# Patient Record
Sex: FEMALE | Race: WHITE | URBAN - METROPOLITAN AREA
[De-identification: names, ages, dates, MRNs, and addresses within clinical notes are randomized per-mention and may not be internally consistent; named-entity substitution may affect disease eponyms.]

---

## 2022-09-10 ENCOUNTER — APPOINTMENT (OUTPATIENT)
Dept: GENERAL RADIOLOGY | Age: 37
DRG: 918 | End: 2022-09-10
Payer: COMMERCIAL

## 2022-09-10 ENCOUNTER — HOSPITAL ENCOUNTER (INPATIENT)
Age: 37
LOS: 2 days | Discharge: HOME | DRG: 918 | End: 2022-09-12
Attending: EMERGENCY MEDICINE | Admitting: STUDENT IN AN ORGANIZED HEALTH CARE EDUCATION/TRAINING PROGRAM
Payer: COMMERCIAL

## 2022-09-10 ENCOUNTER — APPOINTMENT (OUTPATIENT)
Dept: CT IMAGING | Age: 37
DRG: 918 | End: 2022-09-10
Payer: COMMERCIAL

## 2022-09-10 DIAGNOSIS — F11.10 OPIATE ABUSE, CONTINUOUS (HCC): ICD-10-CM

## 2022-09-10 DIAGNOSIS — R41.82 ALTERED MENTAL STATUS, UNSPECIFIED ALTERED MENTAL STATUS TYPE: ICD-10-CM

## 2022-09-10 DIAGNOSIS — T50.901A DRUG OVERDOSE, ACCIDENTAL OR UNINTENTIONAL, INITIAL ENCOUNTER: ICD-10-CM

## 2022-09-10 DIAGNOSIS — R77.8 ELEVATED TROPONIN: Primary | ICD-10-CM

## 2022-09-10 PROBLEM — F15.929 AMPHETAMINE INTOXICATION WITH COMPLICATION (HCC): Status: ACTIVE | Noted: 2022-09-10

## 2022-09-10 PROBLEM — E66.09 CLASS 1 OBESITY DUE TO EXCESS CALORIES WITH SERIOUS COMORBIDITY AND BODY MASS INDEX (BMI) OF 34.0 TO 34.9 IN ADULT: Status: ACTIVE | Noted: 2022-09-10

## 2022-09-10 PROBLEM — F19.929: Status: ACTIVE | Noted: 2022-09-10

## 2022-09-10 PROBLEM — D72.829 LEUKOCYTOSIS: Status: ACTIVE | Noted: 2022-09-10

## 2022-09-10 PROBLEM — F10.929 ALCOHOL INTOXICATION (HCC): Status: ACTIVE | Noted: 2022-09-10

## 2022-09-10 PROBLEM — I21.A1 TYPE 2 ACUTE MYOCARDIAL INFARCTION (HCC): Status: ACTIVE | Noted: 2022-09-10

## 2022-09-10 PROBLEM — E16.2 HYPOGLYCEMIA: Status: ACTIVE | Noted: 2022-09-10

## 2022-09-10 PROBLEM — F12.10 MARIJUANA ABUSE: Status: ACTIVE | Noted: 2022-09-10

## 2022-09-10 PROBLEM — E83.39 HYPERPHOSPHATEMIA: Status: ACTIVE | Noted: 2022-09-10

## 2022-09-10 LAB
ABSOLUTE EOS #: 0 K/UL (ref 0–0.4)
ABSOLUTE IMMATURE GRANULOCYTE: 0.23 K/UL (ref 0–0.3)
ABSOLUTE LYMPH #: 0.92 K/UL (ref 1–4.8)
ABSOLUTE MONO #: 0.69 K/UL (ref 0.2–0.8)
AMMONIA: 41 UMOL/L (ref 11–51)
AMPHETAMINE SCREEN URINE: POSITIVE
ANION GAP SERPL CALCULATED.3IONS-SCNC: 19 MMOL/L (ref 9–17)
ANTI-XA UNFRAC HEPARIN: 0.11 IU/L (ref 0.3–0.7)
BARBITURATE SCREEN URINE: NEGATIVE
BASOPHILS # BLD: 0 %
BASOPHILS ABSOLUTE: 0 K/UL (ref 0–0.2)
BENZODIAZEPINE SCREEN, URINE: NEGATIVE
BUN BLDV-MCNC: 11 MG/DL (ref 6–20)
BUN/CREAT BLD: 8 (ref 9–20)
CALCIUM SERPL-MCNC: 9 MG/DL (ref 8.6–10.4)
CANNABINOID SCREEN URINE: POSITIVE
CHLORIDE BLD-SCNC: 101 MMOL/L (ref 98–107)
CO2: 19 MMOL/L (ref 20–31)
COCAINE METABOLITE, URINE: NEGATIVE
CREAT SERPL-MCNC: 1.41 MG/DL (ref 0.5–0.9)
EKG ATRIAL RATE: 113 BPM
EKG P AXIS: 54 DEGREES
EKG P-R INTERVAL: 150 MS
EKG Q-T INTERVAL: 348 MS
EKG QRS DURATION: 96 MS
EKG QTC CALCULATION (BAZETT): 477 MS
EKG R AXIS: 70 DEGREES
EKG T AXIS: 6 DEGREES
EKG VENTRICULAR RATE: 113 BPM
EOSINOPHILS RELATIVE PERCENT: 0 % (ref 1–4)
ETHANOL PERCENT: 0.1 %
ETHANOL: 99 MG/DL
FENTANYL URINE: POSITIVE
GFR AFRICAN AMERICAN: 51 ML/MIN
GFR NON-AFRICAN AMERICAN: 42 ML/MIN
GFR SERPL CREATININE-BSD FRML MDRD: ABNORMAL ML/MIN/{1.73_M2}
GLUCOSE BLD-MCNC: 164 MG/DL (ref 65–105)
GLUCOSE BLD-MCNC: 164 MG/DL (ref 70–99)
HCG QUALITATIVE: NEGATIVE
HCT VFR BLD CALC: 45.1 % (ref 36.3–47.1)
HEMOGLOBIN: 14.3 G/DL (ref 11.9–15.1)
IMMATURE GRANULOCYTES: 1 %
INR BLD: 1.2
INR BLD: 1.3
LACTIC ACID: 2 MMOL/L (ref 0.5–2.2)
LYMPHOCYTES # BLD: 4 % (ref 24–44)
MAGNESIUM: 2.7 MG/DL (ref 1.6–2.6)
MCH RBC QN AUTO: 30.9 PG (ref 25.2–33.5)
MCHC RBC AUTO-ENTMCNC: 31.7 G/DL (ref 28.4–34.8)
MCV RBC AUTO: 97.4 FL (ref 82.6–102.9)
METHADONE SCREEN, URINE: NEGATIVE
MONOCYTES # BLD: 3 % (ref 1–7)
NRBC AUTOMATED: 0 PER 100 WBC
OPIATES, URINE: NEGATIVE
OXYCODONE SCREEN URINE: NEGATIVE
PARTIAL THROMBOPLASTIN TIME: 31.9 SEC (ref 23.9–33.8)
PARTIAL THROMBOPLASTIN TIME: 34.4 SEC (ref 23.9–33.8)
PDW BLD-RTO: 12.8 % (ref 11.8–14.4)
PHENCYCLIDINE, URINE: NEGATIVE
PHOSPHORUS: 4.2 MG/DL (ref 2.6–4.5)
PHOSPHORUS: 9.5 MG/DL (ref 2.6–4.5)
PLATELET # BLD: 279 K/UL (ref 138–453)
PMV BLD AUTO: 9.5 FL (ref 8.1–13.5)
POTASSIUM SERPL-SCNC: 4.9 MMOL/L (ref 3.7–5.3)
PROTHROMBIN TIME: 15.3 SEC (ref 11.5–14.2)
PROTHROMBIN TIME: 16.5 SEC (ref 11.5–14.2)
RBC # BLD: 4.63 M/UL (ref 3.95–5.11)
SEG NEUTROPHILS: 92 % (ref 36–66)
SEGMENTED NEUTROPHILS ABSOLUTE COUNT: 21.16 K/UL (ref 1.8–7.7)
SODIUM BLD-SCNC: 139 MMOL/L (ref 135–144)
TEST INFORMATION: ABNORMAL
TROPONIN, HIGH SENSITIVITY: 176 NG/L (ref 0–14)
TROPONIN, HIGH SENSITIVITY: 181 NG/L (ref 0–14)
TROPONIN, HIGH SENSITIVITY: 40 NG/L (ref 0–14)
TROPONIN, HIGH SENSITIVITY: 94 NG/L (ref 0–14)
WBC # BLD: 23 K/UL (ref 3.5–11.3)

## 2022-09-10 PROCEDURE — 70450 CT HEAD/BRAIN W/O DYE: CPT

## 2022-09-10 PROCEDURE — 2580000003 HC RX 258: Performed by: EMERGENCY MEDICINE

## 2022-09-10 PROCEDURE — 96366 THER/PROPH/DIAG IV INF ADDON: CPT

## 2022-09-10 PROCEDURE — 80048 BASIC METABOLIC PNL TOTAL CA: CPT

## 2022-09-10 PROCEDURE — 85520 HEPARIN ASSAY: CPT

## 2022-09-10 PROCEDURE — 71045 X-RAY EXAM CHEST 1 VIEW: CPT

## 2022-09-10 PROCEDURE — 96361 HYDRATE IV INFUSION ADD-ON: CPT

## 2022-09-10 PROCEDURE — 83735 ASSAY OF MAGNESIUM: CPT

## 2022-09-10 PROCEDURE — 84703 CHORIONIC GONADOTROPIN ASSAY: CPT

## 2022-09-10 PROCEDURE — 96375 TX/PRO/DX INJ NEW DRUG ADDON: CPT

## 2022-09-10 PROCEDURE — 82947 ASSAY GLUCOSE BLOOD QUANT: CPT

## 2022-09-10 PROCEDURE — 85610 PROTHROMBIN TIME: CPT

## 2022-09-10 PROCEDURE — 84484 ASSAY OF TROPONIN QUANT: CPT

## 2022-09-10 PROCEDURE — 6370000000 HC RX 637 (ALT 250 FOR IP): Performed by: INTERNAL MEDICINE

## 2022-09-10 PROCEDURE — 83605 ASSAY OF LACTIC ACID: CPT

## 2022-09-10 PROCEDURE — 93005 ELECTROCARDIOGRAM TRACING: CPT | Performed by: EMERGENCY MEDICINE

## 2022-09-10 PROCEDURE — 87040 BLOOD CULTURE FOR BACTERIA: CPT

## 2022-09-10 PROCEDURE — 84100 ASSAY OF PHOSPHORUS: CPT

## 2022-09-10 PROCEDURE — 36415 COLL VENOUS BLD VENIPUNCTURE: CPT

## 2022-09-10 PROCEDURE — G0480 DRUG TEST DEF 1-7 CLASSES: HCPCS

## 2022-09-10 PROCEDURE — 85730 THROMBOPLASTIN TIME PARTIAL: CPT

## 2022-09-10 PROCEDURE — 6360000002 HC RX W HCPCS

## 2022-09-10 PROCEDURE — 99285 EMERGENCY DEPT VISIT HI MDM: CPT

## 2022-09-10 PROCEDURE — 2060000000 HC ICU INTERMEDIATE R&B

## 2022-09-10 PROCEDURE — 93005 ELECTROCARDIOGRAM TRACING: CPT | Performed by: INTERNAL MEDICINE

## 2022-09-10 PROCEDURE — 82140 ASSAY OF AMMONIA: CPT

## 2022-09-10 PROCEDURE — 96365 THER/PROPH/DIAG IV INF INIT: CPT

## 2022-09-10 PROCEDURE — 2580000003 HC RX 258: Performed by: INTERNAL MEDICINE

## 2022-09-10 PROCEDURE — 6360000002 HC RX W HCPCS: Performed by: EMERGENCY MEDICINE

## 2022-09-10 PROCEDURE — 80307 DRUG TEST PRSMV CHEM ANLYZR: CPT

## 2022-09-10 PROCEDURE — 99223 1ST HOSP IP/OBS HIGH 75: CPT | Performed by: INTERNAL MEDICINE

## 2022-09-10 PROCEDURE — 85025 COMPLETE CBC W/AUTO DIFF WBC: CPT

## 2022-09-10 RX ORDER — ONDANSETRON 2 MG/ML
4 INJECTION INTRAMUSCULAR; INTRAVENOUS EVERY 6 HOURS PRN
Status: DISCONTINUED | OUTPATIENT
Start: 2022-09-10 | End: 2022-09-12 | Stop reason: HOSPADM

## 2022-09-10 RX ORDER — SODIUM CHLORIDE 0.9 % (FLUSH) 0.9 %
5-40 SYRINGE (ML) INJECTION EVERY 12 HOURS SCHEDULED
Status: DISCONTINUED | OUTPATIENT
Start: 2022-09-10 | End: 2022-09-12 | Stop reason: HOSPADM

## 2022-09-10 RX ORDER — LORAZEPAM 1 MG/1
2 TABLET ORAL
Status: DISCONTINUED | OUTPATIENT
Start: 2022-09-10 | End: 2022-09-12 | Stop reason: HOSPADM

## 2022-09-10 RX ORDER — ONDANSETRON 2 MG/ML
INJECTION INTRAMUSCULAR; INTRAVENOUS
Status: COMPLETED
Start: 2022-09-10 | End: 2022-09-10

## 2022-09-10 RX ORDER — LORAZEPAM 2 MG/ML
2 INJECTION INTRAMUSCULAR
Status: DISCONTINUED | OUTPATIENT
Start: 2022-09-10 | End: 2022-09-12 | Stop reason: HOSPADM

## 2022-09-10 RX ORDER — LORAZEPAM 2 MG/ML
1 INJECTION INTRAMUSCULAR
Status: DISCONTINUED | OUTPATIENT
Start: 2022-09-10 | End: 2022-09-12 | Stop reason: HOSPADM

## 2022-09-10 RX ORDER — SODIUM CHLORIDE 0.9 % (FLUSH) 0.9 %
5-40 SYRINGE (ML) INJECTION PRN
Status: DISCONTINUED | OUTPATIENT
Start: 2022-09-10 | End: 2022-09-12 | Stop reason: HOSPADM

## 2022-09-10 RX ORDER — SODIUM CHLORIDE 9 MG/ML
INJECTION, SOLUTION INTRAVENOUS PRN
Status: DISCONTINUED | OUTPATIENT
Start: 2022-09-10 | End: 2022-09-12 | Stop reason: HOSPADM

## 2022-09-10 RX ORDER — SODIUM CHLORIDE 9 MG/ML
INJECTION, SOLUTION INTRAVENOUS CONTINUOUS
Status: ACTIVE | OUTPATIENT
Start: 2022-09-10 | End: 2022-09-11

## 2022-09-10 RX ORDER — HEPARIN SODIUM 10000 [USP'U]/100ML
5-30 INJECTION, SOLUTION INTRAVENOUS CONTINUOUS
Status: DISCONTINUED | OUTPATIENT
Start: 2022-09-10 | End: 2022-09-11

## 2022-09-10 RX ORDER — LORAZEPAM 2 MG/ML
4 INJECTION INTRAMUSCULAR
Status: DISCONTINUED | OUTPATIENT
Start: 2022-09-10 | End: 2022-09-12 | Stop reason: HOSPADM

## 2022-09-10 RX ORDER — HEPARIN SODIUM 1000 [USP'U]/ML
4000 INJECTION, SOLUTION INTRAVENOUS; SUBCUTANEOUS ONCE
Status: COMPLETED | OUTPATIENT
Start: 2022-09-10 | End: 2022-09-10

## 2022-09-10 RX ORDER — LORAZEPAM 1 MG/1
1 TABLET ORAL
Status: DISCONTINUED | OUTPATIENT
Start: 2022-09-10 | End: 2022-09-12 | Stop reason: HOSPADM

## 2022-09-10 RX ORDER — LORAZEPAM 1 MG/1
4 TABLET ORAL
Status: DISCONTINUED | OUTPATIENT
Start: 2022-09-10 | End: 2022-09-12 | Stop reason: HOSPADM

## 2022-09-10 RX ORDER — ONDANSETRON 2 MG/ML
4 INJECTION INTRAMUSCULAR; INTRAVENOUS ONCE
Status: COMPLETED | OUTPATIENT
Start: 2022-09-10 | End: 2022-09-10

## 2022-09-10 RX ORDER — ACETAMINOPHEN 325 MG/1
650 TABLET ORAL EVERY 4 HOURS PRN
Status: DISCONTINUED | OUTPATIENT
Start: 2022-09-10 | End: 2022-09-12 | Stop reason: HOSPADM

## 2022-09-10 RX ORDER — GAUZE BANDAGE 2" X 2"
100 BANDAGE TOPICAL DAILY
Status: DISCONTINUED | OUTPATIENT
Start: 2022-09-10 | End: 2022-09-12 | Stop reason: HOSPADM

## 2022-09-10 RX ORDER — LORAZEPAM 1 MG/1
3 TABLET ORAL
Status: DISCONTINUED | OUTPATIENT
Start: 2022-09-10 | End: 2022-09-12 | Stop reason: HOSPADM

## 2022-09-10 RX ORDER — 0.9 % SODIUM CHLORIDE 0.9 %
1000 INTRAVENOUS SOLUTION INTRAVENOUS ONCE
Status: COMPLETED | OUTPATIENT
Start: 2022-09-10 | End: 2022-09-10

## 2022-09-10 RX ORDER — ONDANSETRON 4 MG/1
4 TABLET, ORALLY DISINTEGRATING ORAL EVERY 8 HOURS PRN
Status: DISCONTINUED | OUTPATIENT
Start: 2022-09-10 | End: 2022-09-12 | Stop reason: HOSPADM

## 2022-09-10 RX ORDER — HEPARIN SODIUM 1000 [USP'U]/ML
4000 INJECTION, SOLUTION INTRAVENOUS; SUBCUTANEOUS PRN
Status: DISCONTINUED | OUTPATIENT
Start: 2022-09-10 | End: 2022-09-11

## 2022-09-10 RX ORDER — HEPARIN SODIUM 1000 [USP'U]/ML
2000 INJECTION, SOLUTION INTRAVENOUS; SUBCUTANEOUS PRN
Status: DISCONTINUED | OUTPATIENT
Start: 2022-09-10 | End: 2022-09-11

## 2022-09-10 RX ORDER — LORAZEPAM 2 MG/ML
3 INJECTION INTRAMUSCULAR
Status: DISCONTINUED | OUTPATIENT
Start: 2022-09-10 | End: 2022-09-12 | Stop reason: HOSPADM

## 2022-09-10 RX ADMIN — HEPARIN SODIUM 2000 UNITS: 1000 INJECTION INTRAVENOUS; SUBCUTANEOUS at 22:19

## 2022-09-10 RX ADMIN — Medication 100 MG: at 19:58

## 2022-09-10 RX ADMIN — SODIUM CHLORIDE: 9 INJECTION, SOLUTION INTRAVENOUS at 20:01

## 2022-09-10 RX ADMIN — SODIUM CHLORIDE 1000 ML: 9 INJECTION, SOLUTION INTRAVENOUS at 13:35

## 2022-09-10 RX ADMIN — HEPARIN SODIUM 4000 UNITS: 1000 INJECTION INTRAVENOUS; SUBCUTANEOUS at 15:56

## 2022-09-10 RX ADMIN — SODIUM CHLORIDE 1000 ML: 9 INJECTION, SOLUTION INTRAVENOUS at 11:39

## 2022-09-10 RX ADMIN — HEPARIN SODIUM AND DEXTROSE 12 UNITS/KG/HR: 10000; 5 INJECTION INTRAVENOUS at 15:58

## 2022-09-10 RX ADMIN — ONDANSETRON 4 MG: 2 INJECTION INTRAMUSCULAR; INTRAVENOUS at 14:30

## 2022-09-10 RX ADMIN — ONDANSETRON HYDROCHLORIDE 4 MG: 2 INJECTION, SOLUTION INTRAVENOUS at 14:30

## 2022-09-10 ASSESSMENT — PAIN DESCRIPTION - LOCATION: LOCATION: HEAD

## 2022-09-10 ASSESSMENT — PAIN SCALES - GENERAL: PAINLEVEL_OUTOF10: 6

## 2022-09-10 ASSESSMENT — PAIN DESCRIPTION - DESCRIPTORS: DESCRIPTORS: ACHING

## 2022-09-10 NOTE — ED NOTES
The following labs were labeled with appropriate pt sticker and tubed to lab:     [] Blue     [] Lavender   [] on ice  [x] Green/yellow  [] Green/black [] on ice  [] Noretta Beagle  [] on ice  [] Yellow  [] Red  [] Pink  [] ABG  [] VBG    [] COVID-19 swab    [] Rapid  [] PCR  [] Flu swab  [] Peds Viral Panel     [] Urine Sample  [] Pelvic Cultures  [] Blood Cultures  [] X 2  [] STREP Cultures       Araseli Sosa RN  09/10/22 5900

## 2022-09-10 NOTE — ED NOTES
Bedside commode placed at pt bedside. No other needs at this time.       Nj Jacome RN  09/10/22 2016

## 2022-09-10 NOTE — ED NOTES
Pt upset with NPO status, no evidence of education understood at this time as evidenced by pt stating, \"If I don't get something to drink in the next 20 minutes, I'm going to walk my happy ass Niyah Micaela here. \"   Dr Marisa Castelan at bedside to update pt on plan of care and NPO status. Pt in NAD, personal items and call light within reach. Will con't to monitor.       Maria R Godfrey RN  09/10/22 5794

## 2022-09-10 NOTE — ED NOTES
Pt threatening to leave AMA again, pt removed all telemetry \"to get us to get pt something to drink,\". [address], [facility] sent perfect serve message to cardiology about NPO status. Will con't to monitor pt, NAD noted.       Cherise Avila RN  09/10/22 7868

## 2022-09-10 NOTE — H&P
[facility]  Office: [address], DO, Manuela Li, DO, Alejandrinajustino Gutierrez, DO, Ca Dawkinsalonso Rosas, DO, Tesha You MD, Chriss Awad MD, Yessi Miranda MD, Kayla Grissom MD,  John Eaton MD, Collins Gallegos MD, Andie Marc, DO, Zackary Shah MD,  Toma Marmolejo MD, Karen Tijerina MD, Sander Song, DO, Edson Harry MD, Neil Granados MD, Celia Sebastian MD, Joel Brown MD, Dyan Ornelas MD, Nancy Alaniz MD, Ivis Recinos, DO, Patience Baltazar MD, Dietrich Halsted, MD, Luis Antonio Tony, CNP,  Gisselle Perkins, CNP, Monroe Walton, CNP, Radha Bran, CNP,  Corona Hernandez, [facility], Aayush Liriano, CNP, Paula Palacios, CNP, Patricia Andrade, CNP, Stuart Lechuga, CNP, Leonarda Collins, CNP, Kunal Slater, PA-C, Lalita Monte, CNS, Monica Barr, [facility], Eliezer Gonzalez, CNP, Ira Al, CNP, Abelino Moran, [facility]    HISTORY AND PHYSICAL EXAMINATION            Date:   9/10/2022  Patient name:  Wei Deluna  Date of admission:  9/10/2022 11:41 AM  MRN:   [id number]  Account:  [de-identified]  YOB: 1986  PCP:    No primary care provider on file. Room:   [place] [address]  Code Status:    No Order    Chief Complaint:     Chief Complaint   Patient presents with    Altered Mental Status     LS3         History Obtained From:     patient    History of Present Illness:     Wei Deluna is a 39 y.o.  who presents to our hospital for evaluation of altered mental status. On admission, patient was obtunded and non-responsive. She was found to be hypoglycemic and urine drug screen was positive for amphetamines, marijuana, fentanyl. Patient also had a positive ethanol level. Patient is she was out at a bar with friends and does not specifically recall taking any illicit substances. She currently feels very fatigued but denies any chest pain, nausea, vomiting, diarrhea. Has no chills, cough.   On admission, troponin was elevated up from . Patient also noted to have elevated creatinine of 1.4 and high anion gap metabolic acidosis. She denies any sick contacts. Denies any previous medical history. Denies any cocaine use. She does not recall any events leading up to her hospitalization. Past Medical History:     Past Medical History:   Diagnosis Date    Eclampsia (toxemia of pregnancy)         Past Surgical History:     Past Surgical History:   Procedure Laterality Date    HERNIA REPAIR  2010        Medications Prior to Admission:     Prior to Admission medications    Not on File        Allergies:     Patient has no known allergies. Social History:     Tobacco:    reports that she has been smoking cigarettes. She has a 7.50 pack-year smoking history. She has never used smokeless tobacco.  Alcohol:      reports current alcohol use of about 5.0 standard drinks per week. Drug Use:  reports current drug use. Drug: Marijuana Chiara Garcia). Family History:     Family History   Problem Relation Age of Onset    Amyotrophic lateral sclerosis Mother     Lung Cancer Father     Autism Spectrum Disorder Daughter        Review of Systems:     Positive and Negative as described in HPI.     CONSTITUTIONAL:  negative for fevers, chills, sweats, fatigue, weight loss  HEENT:  negative for vision, hearing changes, runny nose, throat pain  RESPIRATORY:  negative for shortness of breath, cough, congestion, wheezing  CARDIOVASCULAR:  negative for chest pain, palpitations  GASTROINTESTINAL:  negative for nausea, vomiting, diarrhea, constipation, change in bowel habits, abdominal pain   GENITOURINARY:  negative for difficulty of urination, burning with urination, frequency   INTEGUMENT:  negative for rash, skin lesions, easy bruising   HEMATOLOGIC/LYMPHATIC:  negative for swelling/edema   ALLERGIC/IMMUNOLOGIC:  negative for urticaria , itching  ENDOCRINE:  negative increase in drinking, increase in urination, hot or cold POC Glucose 164 (H) 65 - 105 mg/dL   EKG 12 Lead    Collection Time: 09/10/22 11:44 AM   Result Value Ref Range    Ventricular Rate 113 BPM    Atrial Rate 113 BPM    P-R Interval 150 ms    QRS Duration 96 ms    Q-T Interval 348 ms    QTc Calculation (Bazett) 477 ms    P Axis 54 degrees    R Axis 70 degrees    T Axis 6 degrees   Troponin    Collection Time: 09/10/22 11:46 AM   Result Value Ref Range    Troponin, High Sensitivity 40 (H) 0 - 14 ng/L   APTT    Collection Time: 09/10/22 11:46 AM   Result Value Ref Range    PTT 34.4 (H) 23.9 - 33.8 sec   Protime-INR    Collection Time: 09/10/22 11:46 AM   Result Value Ref Range    Protime 15.3 (H) 11.5 - 14.2 sec    INR 1.2    Phosphorus    Collection Time: 09/10/22 11:46 AM   Result Value Ref Range    Phosphorus 9.5 (HH) 2.6 - 4.5 mg/dL   Magnesium    Collection Time: 09/10/22 11:46 AM   Result Value Ref Range    Magnesium 2.7 (H) 1.6 - 2.6 mg/dL   Basic Metabolic Panel    Collection Time: 09/10/22 11:46 AM   Result Value Ref Range    Glucose 164 (H) 70 - 99 mg/dL    BUN 11 6 - 20 mg/dL    Creatinine 1.41 (H) 0.50 - 0.90 mg/dL    Bun/Cre Ratio 8 (L) 9 - 20    Calcium 9.0 8.6 - 10.4 mg/dL    Sodium 139 135 - 144 mmol/L    Potassium 4.9 3.7 - 5.3 mmol/L    Chloride 101 98 - 107 mmol/L    CO2 19 (L) 20 - 31 mmol/L    Anion Gap 19 (H) 9 - 17 mmol/L    GFR Non-African American 42 (L) >60 mL/min    GFR  51 (L) >60 mL/min    GFR Comment         CBC with Auto Differential    Collection Time: 09/10/22 11:46 AM   Result Value Ref Range    WBC 23.0 (H) 3.5 - 11.3 k/uL    RBC 4.63 3.95 - 5.11 m/uL    Hemoglobin 14.3 11.9 - 15.1 g/dL    Hematocrit 45.1 36.3 - 47.1 %    MCV 97.4 82.6 - 102.9 fL    MCH 30.9 25.2 - 33.5 pg    MCHC 31.7 28.4 - 34.8 g/dL    RDW 12.8 11.8 - 14.4 %    Platelets 647 427 - 852 k/uL    MPV 9.5 8.1 - 13.5 fL    NRBC Automated 0.0 0.0 per 100 WBC    Seg Neutrophils 92 (H) 36 - 66 %    Lymphocytes 4 (L) 24 - 44 %    Monocytes 3 1 - 7 % Eosinophils % 0 (L) 1 - 4 %    Basophils 0 %    Immature Granulocytes 1 (H) 0 %    Segs Absolute 21.16 (H) 1.8 - 7.7 k/uL    Absolute Lymph # 0.92 (L) 1.0 - 4.8 k/uL    Absolute Mono # 0.69 0.2 - 0.8 k/uL    Absolute Eos # 0.00 0.0 - 0.4 k/uL    Basophils Absolute 0.00 0.0 - 0.2 k/uL    Absolute Immature Granulocyte 0.23 0.00 - 0.30 k/uL   Ethanol    Collection Time: 09/10/22 11:46 AM   Result Value Ref Range    Ethanol 99 (H) <10 mg/dL    Ethanol percent 0.099 (H) <0.010 %   Drug Scr, Abuse, Ur    Collection Time: 09/10/22 11:56 AM   Result Value Ref Range    Amphetamine Screen, Ur POSITIVE (A) NEGATIVE    Barbiturate Screen, Ur NEGATIVE NEGATIVE    Benzodiazepine Screen, Urine NEGATIVE NEGATIVE    Cocaine Metabolite, Urine NEGATIVE NEGATIVE    Methadone Screen, Urine NEGATIVE NEGATIVE    Opiates, Urine NEGATIVE NEGATIVE    Phencyclidine, Urine NEGATIVE NEGATIVE    Cannabinoid Scrn, Ur POSITIVE (A) NEGATIVE    Oxycodone Screen, Ur NEGATIVE NEGATIVE    Fentanyl, Ur POSITIVE (A) NEGATIVE    Test Information       Assay provides medical screening only. The absence of expected drug(s) and/or metabolite(s) may indicate diluted or adulterated urine, limitations of testing or timing of collection. Troponin    Collection Time: 09/10/22  1:38 PM   Result Value Ref Range    Troponin, High Sensitivity 94 (HH) 0 - 14 ng/L   APTT    Collection Time: 09/10/22  3:02 PM   Result Value Ref Range    PTT 31.9 23.9 - 33.8 sec   Protime-INR    Collection Time: 09/10/22  3:02 PM   Result Value Ref Range    Protime 16.5 (H) 11.5 - 14.2 sec    INR 1.3    Troponin    Collection Time: 09/10/22  4:30 PM   Result Value Ref Range    Troponin, High Sensitivity 176 (HH) 0 - 14 ng/L   Troponin    Collection Time: 09/10/22  6:25 PM   Result Value Ref Range    Troponin, High Sensitivity 181 (HH) 0 - 14 ng/L       Imaging/Diagnostics:  CT HEAD WO CONTRAST    Result Date: 9/10/2022  No acute intracranial abnormality.      XR CHEST PORTABLE    Result Date: 9/10/2022  Mild pulmonary congestion. Assessment :      Hospital Problems             Last Modified POA    Intoxication due to misuse of recreational drug ([facility] [address] 9/10/2022 Yes    Marijuana abuse 9/10/2022 Yes    Amphetamine intoxication with complication ([facility] [address] 6/93/4989 Yes    Alcohol intoxication ([facility] [address] 9/10/2022 Yes    Hyperphosphatemia 9/10/2022 Yes    Type 2 acute myocardial infarction [facility]) 9/10/2022 Yes    Leukocytosis 9/10/2022 Yes    Hypoglycemia 9/10/2022 Yes    Class 1 obesity due to excess calories with serious comorbidity and body mass index (BMI) of 34.0 to 34.9 in adult 9/10/2022 Yes       Plan:     Patient status inpatient in the Progressive Unit/Step down    Altered mental status likely secondary to polysubstance abuse/intoxication. Patient had multiple positive illicit substances in urine and appeared somnolent on admission. Elevated troponin likely due to type II MI. Continue heparin drip for now, cardiology consulted for further recommendations. Monitor for signs of alcohol/drug withdrawal.  Will order benzodiazepine as needed. Repeat phos level STAT  Monitor glucose, patient was hypoglycemic on admission. Keep goal between 140 and 180  Leukocytosis likely reactive, doubt any infectious process. Will monitor. Check blood cultures if symptoms return. Keep off of antibiotics  Telemetry   PTOT  Activity as tolerated. Consultations:   IP CONSULT TO CARDIOLOGY  IP CONSULT TO INTERNAL MEDICINE  IP CONSULT TO SOCIAL WORK     Patient is admitted as inpatient status because of co-morbidities listed above, severity of signs and symptoms as outlined, requirement for current medical therapies and most importantly because of direct risk to patient if care not provided in a hospital setting. Expected length of stay > 48 hours. Damián Arnold DO  9/10/2022  7:23 PM    Copy sent to Dr. Razia Hernandez primary care provider on file.

## 2022-09-10 NOTE — ED NOTES
ED to inpatient nurses report     Chief Complaint   Patient presents with    Altered Mental Status     LS3        Present to ED from via ems for possible drug overdose and etOH use. Per EMS pt was only responsive to painful stimuli. Per EMS pt has pervious hx of drug abuse. Pt now A/O. Pt ambulatory and on RA. LOC: alert and orientated to name, place, date  Vital signs   Vitals:    09/10/22 1745 09/10/22 1746 09/10/22 1800 09/10/22 1801   BP: (!) 131/99  131/67    Pulse: (!) 115 (!) 114 (!) 105    Resp: 18 19 15    Temp:       TempSrc:       SpO2: 93% 99%  97%   Weight:       Height:          Oxygen Baseline RA    Current needs required RA   LDAs:   Peripheral IV 09/10/22 Proximal;Right; Anterior Forearm (Active)   Site Assessment Clean, dry & intact 09/10/22 1208   Line Status Brisk blood return;Flushed; Infusing;Specimen collected 09/10/22 [address] [facility] checked and tightened 09/10/22 1208   Phlebitis Assessment No symptoms 09/10/22 1208   Infiltration Assessment 0 09/10/22 1208   Dressing Status Clean, dry & intact 09/10/22 1208   Dressing Type Transparent 09/10/22 1208     Mobility: Requires assistance * 1  Fall Risk:    Pending ED orders: Anti-Xa  Present condition: stable  Code Status: full  Consults: IP CONSULT TO CARDIOLOGY  IP CONSULT TO INTERNAL MEDICINE  IP CONSULT TO SOCIAL WORK  []  Hospitalist  Completed  [] yes [] no Who:   [x]  Medicine  Completed  [x] yes [] No Who:   [x]  Cardiology  Completed  [x] yes [] No Who:   []  GI   Completed  [] yes [] No Who:   []  Neurology  Completed  [] yes [] No Who:   []  Nephrology Completed  [] yes [] No Who:    []  Vascular  Completed  [] yes [] No Who:   []  Ortho  Completed  [] yes [] No Who:     []  Surgery  Completed  [] yes [] No Who:    []  Urology  Completed  [] yes [] No Who:    []  CT Surgery Completed  [] yes [] No Who:   []  Podiatry  Completed  [] yes [] No Who:    []  Other    Completed  [] yes [] No Who:    Interventions: CXR, CT head, zofran, 2L bolus, heparin @ 12mg/kg/hr, 4000 U heparin bolus  Important Events: urine positive for amphetamine, cannabinoid, fentanyl; Trop 40-->94-->176-->181; etOH 99, creatinine 1.41, phos 9.5, WBC, 23.0       Electronically signed by Eula Samuel RN on 9/10/2022 at 7:47 PM     Eula Samuel RN  09/10/22 1954

## 2022-09-10 NOTE — ED NOTES
Pt ambulatory to restroom. Steady gait noted. Will continue to monitor.        [facility], [facility]0 [facility]  09/10/22 2890

## 2022-09-10 NOTE — ED NOTES
Straight cath performed to obtain urine sample. Pt tolerated. Urine sample collected and sent to lab.      [facility]  09/10/22 2260

## 2022-09-10 NOTE — ED NOTES
Admit team at bedside for pt assessment and update on plan of care.       Acosta Tony RN  09/10/22 9270

## 2022-09-10 NOTE — ED PROVIDER NOTES
EMERGENCY DEPARTMENT ENCOUNTER    Pt Name: Liz Fontaine  MRN: [id number]  [place] 8/21/1986  Date of evaluation: 9/10/22  CHIEF COMPLAINT       Chief Complaint   Patient presents with    Altered Mental Status     LS3       HISTORY OF PRESENT ILLNESS   26-year-old female presents in an altered mental state via EMS. Suspected alcohol and opiate abuse. EMS did give the patient 2 of Narcan. Patient also found to have a blood glucose of 27 and EMS did provide the patient with an amp of D50 and the patient's blood sugar went above 100. The history is provided by the EMS personnel. REVIEW OF SYSTEMS     Review of Systems   Unable to perform ROS: Mental status change   PASTMEDICAL HISTORY   No past medical history on file. Past Problem List  There is no problem list on file for this patient. SURGICAL HISTORY     No past surgical history on file. CURRENT MEDICATIONS       Previous Medications    No medications on file     ALLERGIES     has no allergies on file. FAMILY HISTORY     has no family status information on file. SOCIAL HISTORY        PHYSICAL EXAM     INITIAL VITALS: BP (!) 131/101   Pulse (!) 111   Temp 97.1 °F (36.2 °C) (Axillary)   Resp 17   Ht 5' 5\" (1.651 m)   Wt 210 lb (95.3 kg)   SpO2 96%   BMI 34.95 kg/m²    Physical Exam  Vitals reviewed. Constitutional:       General: She is not in acute distress. Appearance: Normal appearance. She is not ill-appearing or toxic-appearing. HENT:      Head: Normocephalic and atraumatic. Right Ear: External ear normal.      Left Ear: External ear normal.      Nose: No congestion or rhinorrhea. Eyes:      Extraocular Movements: Extraocular movements intact. Pupils: Pupils are equal, round, and reactive to light. Cardiovascular:      Rate and Rhythm: Normal rate and regular rhythm. Pulses: Normal pulses. Heart sounds: Normal heart sounds.    Pulmonary:      Effort: Pulmonary effort is normal. No respiratory distress. Breath sounds: Normal breath sounds. No wheezing. Abdominal:      General: Bowel sounds are normal. There is no distension. Palpations: Abdomen is soft. Tenderness: There is no abdominal tenderness. Musculoskeletal:         General: No deformity or signs of injury. Normal range of motion. Cervical back: No rigidity or tenderness. Skin:     General: Skin is warm and dry. Neurological:      Mental Status: She is alert. GCS: GCS eye subscore is 3. GCS verbal subscore is 4. GCS motor subscore is 6. Psychiatric:         Behavior: Behavior is slowed. Thought Content: Thought content does not include suicidal ideation. MEDICAL DECISION MAKING:   Patient with altered mental state. This is likely secondary to an overdose. Patient's urine drug screen did come back positive for amphetamines, cannabinoids, fentanyl. CT of the head did not display signs of acute abnormality. Patient's mental state did improve as time went on in the ER. Cardiac work-up in the ER did display elevating troponins. A consult to cardiology was placed and was spoken with in the ER. It was agreed that the patient should be started on heparin and admitted. Patient admitted after speaking with the admitting team.  Patient understands and agrees with the plan. CRITICAL CARE:       PROCEDURES:    Procedures    DIAGNOSTIC RESULTS   EKG:All EKG's are interpreted by the Emergency Department Physician who either signs or Co-signs this chart in the absence of a cardiologist.        RADIOLOGY:All plain film, CT, MRI, and formal ultrasound images (except ED bedside ultrasound) are read by the radiologist, see reports below, unless otherwisenoted in MDM or here. CT HEAD WO CONTRAST   Final Result   No acute intracranial abnormality. XR CHEST PORTABLE   Final Result   Mild pulmonary congestion.            LABS: All lab results were reviewed by myself, and all abnormals are listed below.   Labs Reviewed   TROPONIN - Abnormal; Notable for the following components:       Result Value    Troponin, High Sensitivity 40 (*)     All other components within normal limits   TROPONIN - Abnormal; Notable for the following components:    Troponin, High Sensitivity 94 (*)     All other components within normal limits   APTT - Abnormal; Notable for the following components:    PTT 34.4 (*)     All other components within normal limits   PROTIME-INR - Abnormal; Notable for the following components:    Protime 15.3 (*)     All other components within normal limits   PHOSPHORUS - Abnormal; Notable for the following components:    Phosphorus 9.5 (*)     All other components within normal limits   MAGNESIUM - Abnormal; Notable for the following components:    Magnesium 2.7 (*)     All other components within normal limits   BASIC METABOLIC PANEL - Abnormal; Notable for the following components:    Glucose 164 (*)     Creatinine 1.41 (*)     Bun/Cre Ratio 8 (*)     CO2 19 (*)     Anion Gap 19 (*)     GFR Non- 42 (*)     GFR  51 (*)     All other components within normal limits   CBC WITH AUTO DIFFERENTIAL - Abnormal; Notable for the following components:    WBC 23.0 (*)     Seg Neutrophils 92 (*)     Lymphocytes 4 (*)     Eosinophils % 0 (*)     Immature Granulocytes 1 (*)     Segs Absolute 21.16 (*)     Absolute Lymph # 0.92 (*)     All other components within normal limits   URINE DRUG SCREEN - Abnormal; Notable for the following components:    Amphetamine Screen, Ur POSITIVE (*)     Cannabinoid Scrn, Ur POSITIVE (*)     Fentanyl, Ur POSITIVE (*)     All other components within normal limits   ETHANOL - Abnormal; Notable for the following components:    Ethanol 99 (*)     Ethanol percent 0.099 (*)     All other components within normal limits   PROTIME-INR - Abnormal; Notable for the following components:    Protime 16.5 (*)     All other components within normal limits   POC GLUCOSE FINGERSTICK - Abnormal; Notable for the following components:    POC Glucose 164 (*)     All other components within normal limits   AMMONIA   APTT   URINALYSIS WITH REFLEX TO CULTURE   TROPONIN   TROPONIN   ANTI-XA, UNFRACTIONATED HEPARIN   ANTI-XA, UNFRACTIONATED HEPARIN       EMERGENCY DEPARTMENTCOURSE:         Vitals:    Vitals:    09/10/22 1315 09/10/22 1416 09/10/22 1430 09/10/22 1500   BP: (!) 159/99 (!) 144/91 127/85 (!) 131/101   Pulse: (!) 109 (!) 114 (!) 107 (!) 111   Resp: 15 12 14 17   Temp:       TempSrc:       SpO2: 95% 97% 94% 96%   Weight:       Height:           The patient was given the following medications while in the emergency department:  Orders Placed This Encounter   Medications    0.9 % sodium chloride bolus    0.9 % sodium chloride bolus    ondansetron (ZOFRAN) 4 MG/2ML injection     Araseli Albert: cabinet override    ondansetron (ZOFRAN) injection 4 mg    heparin (porcine) injection 4,000 Units    heparin (porcine) injection 4,000 Units    heparin (porcine) injection 2,000 Units    heparin 25,000 units in dextrose 5% 250 mL (premix) infusion     CONSULTS:  IP CONSULT TO CARDIOLOGY  IP CONSULT TO INTERNAL MEDICINE    FINAL IMPRESSION      1. Elevated troponin    2. Altered mental status, unspecified altered mental status type          DISPOSITION/PLAN   DISPOSITION Decision To Admit 09/10/2022 02:36:28 PM      PATIENT REFERRED TO:  No follow-up provider specified. DISCHARGE MEDICATIONS:  New Prescriptions    No medications on file     The care is provided during an unprecedented national emergency due to the novel coronavirus, COVID 19.   DO Oliver Brock DO  09/10/22 1545

## 2022-09-10 NOTE — ED NOTES
The following labs were labeled with appropriate pt sticker and tubed to lab:     [] Blue     [] Lavender   [] on ice  [x] Green/yellow  [] Green/black [] on ice  [] Cathye Jacob  [] on ice  [] Yellow  [] Red  [] Pink  [] ABG  [] VBG    [] COVID-19 swab    [] Rapid  [] PCR  [] Flu swab  [] Peds Viral Panel     [] Urine Sample  [] Pelvic Cultures  [] Blood Cultures  [] X 2  [] STREP Cultures     [address]  09/10/22 [facility]9

## 2022-09-10 NOTE — ED TRIAGE NOTES
Pt presents to ED from home via [place], per LS3 report pt was drinking yesterday, was told that pt was \"eating candy\" yesterday in addition to drinking. This morning pt was found around 0800am with agonal RR per report. Family on scene reported that pt has hx of drug use. 2mg Narcan was given, BG checked and was 27; LS3 administered amp of D50, BG improved to 114 but no change in mental status. LS3 obtained 18G R AC in field. Upon arrival GCS 10, pt arousable with ammonia salts and sternal rub. EKG, BG, and straight cath obtained at bedside upon pt arrival.   Pt placed on cardiac telemetry, SpO2, and BP monitor at this time. Pt resting in bed RR even and unlabored with personal belongings and call light in reach, NAD noted at this time. Dr Ebenezer Stuart at bedside for pt assessment.

## 2022-09-10 NOTE — ED TRIAGE NOTES
Pt reports to writer and Dr Sergio Irving that pt was given what pt thought was a klonopin pill yesterday, and the last thing pt remembers was eating dinner last night at approximately [id number].

## 2022-09-11 LAB
ANTI-XA UNFRAC HEPARIN: 0.18 IU/L (ref 0.3–0.7)
ANTI-XA UNFRAC HEPARIN: 0.35 IU/L (ref 0.3–0.7)
HCG QUALITATIVE: NEGATIVE

## 2022-09-11 PROCEDURE — 6360000002 HC RX W HCPCS: Performed by: INTERNAL MEDICINE

## 2022-09-11 PROCEDURE — 84703 CHORIONIC GONADOTROPIN ASSAY: CPT

## 2022-09-11 PROCEDURE — 6370000000 HC RX 637 (ALT 250 FOR IP): Performed by: INTERNAL MEDICINE

## 2022-09-11 PROCEDURE — 85520 HEPARIN ASSAY: CPT

## 2022-09-11 PROCEDURE — 6360000002 HC RX W HCPCS: Performed by: EMERGENCY MEDICINE

## 2022-09-11 PROCEDURE — 6370000000 HC RX 637 (ALT 250 FOR IP): Performed by: STUDENT IN AN ORGANIZED HEALTH CARE EDUCATION/TRAINING PROGRAM

## 2022-09-11 PROCEDURE — 99232 SBSQ HOSP IP/OBS MODERATE 35: CPT | Performed by: STUDENT IN AN ORGANIZED HEALTH CARE EDUCATION/TRAINING PROGRAM

## 2022-09-11 PROCEDURE — 2580000003 HC RX 258: Performed by: INTERNAL MEDICINE

## 2022-09-11 PROCEDURE — 2060000000 HC ICU INTERMEDIATE R&B

## 2022-09-11 PROCEDURE — 36415 COLL VENOUS BLD VENIPUNCTURE: CPT

## 2022-09-11 RX ORDER — NITROGLYCERIN 0.4 MG/1
0.4 TABLET SUBLINGUAL EVERY 5 MIN PRN
Status: CANCELLED | OUTPATIENT
Start: 2022-09-11 | End: 2022-09-11

## 2022-09-11 RX ORDER — SODIUM CHLORIDE 9 MG/ML
500 INJECTION, SOLUTION INTRAVENOUS CONTINUOUS PRN
Status: CANCELLED | OUTPATIENT
Start: 2022-09-11 | End: 2022-09-11

## 2022-09-11 RX ORDER — METOPROLOL SUCCINATE 50 MG/1
50 TABLET, EXTENDED RELEASE ORAL DAILY
Status: DISCONTINUED | OUTPATIENT
Start: 2022-09-11 | End: 2022-09-12 | Stop reason: HOSPADM

## 2022-09-11 RX ORDER — ASPIRIN 81 MG/1
81 TABLET, CHEWABLE ORAL DAILY
Status: DISCONTINUED | OUTPATIENT
Start: 2022-09-11 | End: 2022-09-12 | Stop reason: HOSPADM

## 2022-09-11 RX ORDER — METOPROLOL TARTRATE 5 MG/5ML
5 INJECTION INTRAVENOUS EVERY 5 MIN PRN
Status: CANCELLED | OUTPATIENT
Start: 2022-09-11 | End: 2022-09-11

## 2022-09-11 RX ORDER — BUSPIRONE HYDROCHLORIDE 10 MG/1
10 TABLET ORAL 3 TIMES DAILY
Status: DISCONTINUED | OUTPATIENT
Start: 2022-09-11 | End: 2022-09-12 | Stop reason: HOSPADM

## 2022-09-11 RX ORDER — SODIUM CHLORIDE 0.9 % (FLUSH) 0.9 %
5-40 SYRINGE (ML) INJECTION PRN
Status: CANCELLED | OUTPATIENT
Start: 2022-09-11 | End: 2022-09-11

## 2022-09-11 RX ORDER — ATROPINE SULFATE 0.1 MG/ML
0.5 INJECTION INTRAVENOUS EVERY 5 MIN PRN
Status: CANCELLED | OUTPATIENT
Start: 2022-09-11 | End: 2022-09-11

## 2022-09-11 RX ORDER — ALBUTEROL SULFATE 90 UG/1
2 AEROSOL, METERED RESPIRATORY (INHALATION) PRN
Status: CANCELLED | OUTPATIENT
Start: 2022-09-11 | End: 2022-09-11

## 2022-09-11 RX ORDER — ENOXAPARIN SODIUM 100 MG/ML
1 INJECTION SUBCUTANEOUS 2 TIMES DAILY
Status: DISCONTINUED | OUTPATIENT
Start: 2022-09-11 | End: 2022-09-12 | Stop reason: HOSPADM

## 2022-09-11 RX ADMIN — SODIUM CHLORIDE: 9 INJECTION, SOLUTION INTRAVENOUS at 06:19

## 2022-09-11 RX ADMIN — BUSPIRONE HYDROCHLORIDE 10 MG: 10 TABLET ORAL at 13:46

## 2022-09-11 RX ADMIN — ENOXAPARIN SODIUM 100 MG: 100 INJECTION SUBCUTANEOUS at 11:30

## 2022-09-11 RX ADMIN — Medication 100 MG: at 09:19

## 2022-09-11 RX ADMIN — SODIUM CHLORIDE, PRESERVATIVE FREE 10 ML: 5 INJECTION INTRAVENOUS at 20:01

## 2022-09-11 RX ADMIN — HEPARIN SODIUM AND DEXTROSE 16 UNITS/KG/HR: 10000; 5 INJECTION INTRAVENOUS at 06:20

## 2022-09-11 RX ADMIN — ASPIRIN 81 MG 81 MG: 81 TABLET ORAL at 11:30

## 2022-09-11 RX ADMIN — HEPARIN SODIUM 2000 UNITS: 1000 INJECTION INTRAVENOUS; SUBCUTANEOUS at 02:59

## 2022-09-11 RX ADMIN — ENOXAPARIN SODIUM 100 MG: 100 INJECTION SUBCUTANEOUS at 20:01

## 2022-09-11 RX ADMIN — BUSPIRONE HYDROCHLORIDE 10 MG: 10 TABLET ORAL at 20:01

## 2022-09-11 RX ADMIN — METOPROLOL SUCCINATE 50 MG: 50 TABLET, EXTENDED RELEASE ORAL at 11:30
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
## 2022-09-11 NOTE — PLAN OF CARE
Patient will be free from pain, pain goal is 2. Problem: Pain  Goal: Verbalizes/displays adequate comfort level or baseline comfort level  9/11/2022 0018 by Morena Chung RN  Outcome: Progressing  9/11/2022 0017 by Morena Chung RN  Outcome: Progressing     Patient will be free from falls during shift.   Problem: Safety - Adult  Goal: Free from fall injury  Outcome: Progressing

## 2022-09-11 NOTE — CARE COORDINATION
Case Management Initial Discharge Plan  Sammyraquel Valdovinose,         Readmission Risk              Risk of Unplanned Readmission:  13             Met with:patient to discuss discharge plans. Information verified: address, contacts, phone number, , insurance Yes  PCP: has NP at [facility]   Date of last visit: few months ago     Address change to   [address]  [address]    Insurance Provider: either Lori Yoon or festus . . updated HELP     Discharge Planning  Current Residence:  [address]  Living Arrangements:  Friends       Home is an apt with no steps to climb   Support Systems:  Friends/Neighbors      Current Services PTA:  none  Agency: none      Patient able to perform ADL's:Independent  DME in home:  none  DME used to aid ambulation prior to admission:   none  DME used during admission:  none     Potential Assistance Needed:  N/A    [facility]: [facility]    Potential Assistance Purchasing Medications:   no  Does patient want to participate in local refill/ meds to beds program?   Yes     Patient agreeable to home care: No  [place] of choice provided:  no      Type of Home Care Services:  None  Patient expects to be discharged to:   home     Prior SNF/Rehab Placement and Facility: none  Agreeable to SNF/Rehab: No  [place] of choice provided: n/a   Evaluation: yes    Expected Discharge date:      Follow Up Appointment: Best Day/ Time:  any     Transportation provider: per family   Transportation arrangements needed for discharge: No    Discharge Plan:   Met with patient to complete discharge plan. Patient lives at home with a roommate Young Heatclay. Patient is not wanting to answer any questions. Wants to go home or go to sleep    Patient unsure of her insurance states it is either [employer] or [employer]. Left message for HELP as face sheet is showing no insurance. Asked if patient had insurance card with her and she ignored the writer.      Patient is not interested in any further discussion. Will attempt tomorrow to see if interested in any rehab resources  as she was admitted with unintentional drug overdose.      Electronically signed by Jess Shen RN on 9/11/22 at 12:46 PM EDT

## 2022-09-11 NOTE — PROGRESS NOTES
Dr. Sherry Castro and Ankita Siddiqui NP came to the unit, informed writer that patient sees another Cardiology group. Per patient she sees Dr. David Escalera. Will notify Dr. Mine Reagan.

## 2022-09-11 NOTE — ED NOTES
Attempted report to 1001, unit clerk reported that RN not on unit at this time. Writer notified unit clerk that Christopher Betancourt was in, and to call writer with questions.       Maria R Godfrey RN  09/10/22 2022

## 2022-09-11 NOTE — PROGRESS NOTES
Pt arrived to unit from ED. Vitals taken. No distress noted. pt placed on telemerty, call light given, pt oriented to room. Safety measures in place.

## 2022-09-12 ENCOUNTER — APPOINTMENT (OUTPATIENT)
Dept: NUCLEAR MEDICINE | Age: 37
DRG: 918 | End: 2022-09-12
Payer: COMMERCIAL

## 2022-09-12 VITALS
OXYGEN SATURATION: 96 % | BODY MASS INDEX: 34.99 KG/M2 | RESPIRATION RATE: 16 BRPM | WEIGHT: 210 LBS | DIASTOLIC BLOOD PRESSURE: 75 MMHG | TEMPERATURE: 99 F | HEART RATE: 68 BPM | HEIGHT: 65 IN | SYSTOLIC BLOOD PRESSURE: 131 MMHG

## 2022-09-12 PROBLEM — R77.8 ELEVATED TROPONIN: Status: ACTIVE | Noted: 2022-09-12

## 2022-09-12 PROBLEM — F11.10 OPIATE ABUSE, CONTINUOUS (HCC): Status: ACTIVE | Noted: 2022-09-12

## 2022-09-12 PROBLEM — F15.10 AMPHETAMINE ABUSE (HCC): Status: ACTIVE | Noted: 2022-09-12

## 2022-09-12 LAB
ANION GAP SERPL CALCULATED.3IONS-SCNC: 9 MMOL/L (ref 9–17)
BUN BLDV-MCNC: 7 MG/DL (ref 6–20)
BUN/CREAT BLD: 11 (ref 9–20)
CALCIUM SERPL-MCNC: 8.9 MG/DL (ref 8.6–10.4)
CHLORIDE BLD-SCNC: 102 MMOL/L (ref 98–107)
CO2: 28 MMOL/L (ref 20–31)
CREAT SERPL-MCNC: 0.66 MG/DL (ref 0.5–0.9)
EKG ATRIAL RATE: 105 BPM
EKG ATRIAL RATE: 106 BPM
EKG P AXIS: 50 DEGREES
EKG P AXIS: 57 DEGREES
EKG P-R INTERVAL: 146 MS
EKG P-R INTERVAL: 154 MS
EKG Q-T INTERVAL: 366 MS
EKG Q-T INTERVAL: 374 MS
EKG QRS DURATION: 78 MS
EKG QRS DURATION: 80 MS
EKG QTC CALCULATION (BAZETT): 486 MS
EKG QTC CALCULATION (BAZETT): 494 MS
EKG R AXIS: 47 DEGREES
EKG R AXIS: 52 DEGREES
EKG T AXIS: 0 DEGREES
EKG T AXIS: 6 DEGREES
EKG VENTRICULAR RATE: 105 BPM
EKG VENTRICULAR RATE: 106 BPM
GFR AFRICAN AMERICAN: >60 ML/MIN
GFR NON-AFRICAN AMERICAN: >60 ML/MIN
GFR SERPL CREATININE-BSD FRML MDRD: NORMAL ML/MIN/{1.73_M2}
GLUCOSE BLD-MCNC: 82 MG/DL (ref 70–99)
HCT VFR BLD CALC: 42.7 % (ref 36.3–47.1)
HEMOGLOBIN: 13.7 G/DL (ref 11.9–15.1)
LV EF: 52 %
LV EF: 65 %
LVEF MODALITY: NORMAL
LVEF MODALITY: NORMAL
MCH RBC QN AUTO: 30.6 PG (ref 25.2–33.5)
MCHC RBC AUTO-ENTMCNC: 32.1 G/DL (ref 28.4–34.8)
MCV RBC AUTO: 95.5 FL (ref 82.6–102.9)
NRBC AUTOMATED: 0 PER 100 WBC
PDW BLD-RTO: 12.5 % (ref 11.8–14.4)
PLATELET # BLD: 192 K/UL (ref 138–453)
PMV BLD AUTO: 10.5 FL (ref 8.1–13.5)
POTASSIUM SERPL-SCNC: 4.3 MMOL/L (ref 3.7–5.3)
RBC # BLD: 4.47 M/UL (ref 3.95–5.11)
SODIUM BLD-SCNC: 139 MMOL/L (ref 135–144)
WBC # BLD: 6.5 K/UL (ref 3.5–11.3)

## 2022-09-12 PROCEDURE — 99239 HOSP IP/OBS DSCHRG MGMT >30: CPT | Performed by: NURSE PRACTITIONER

## 2022-09-12 PROCEDURE — 6360000002 HC RX W HCPCS: Performed by: INTERNAL MEDICINE

## 2022-09-12 PROCEDURE — APPSS45 APP SPLIT SHARED TIME 31-45 MINUTES: Performed by: NURSE PRACTITIONER

## 2022-09-12 PROCEDURE — 6370000000 HC RX 637 (ALT 250 FOR IP): Performed by: STUDENT IN AN ORGANIZED HEALTH CARE EDUCATION/TRAINING PROGRAM

## 2022-09-12 PROCEDURE — 6370000000 HC RX 637 (ALT 250 FOR IP): Performed by: INTERNAL MEDICINE

## 2022-09-12 PROCEDURE — 80048 BASIC METABOLIC PNL TOTAL CA: CPT

## 2022-09-12 PROCEDURE — 85027 COMPLETE CBC AUTOMATED: CPT

## 2022-09-12 PROCEDURE — 78452 HT MUSCLE IMAGE SPECT MULT: CPT

## 2022-09-12 PROCEDURE — 3430000000 HC RX DIAGNOSTIC RADIOPHARMACEUTICAL: Performed by: INTERNAL MEDICINE

## 2022-09-12 PROCEDURE — 93017 CV STRESS TEST TRACING ONLY: CPT

## 2022-09-12 PROCEDURE — 93306 TTE W/DOPPLER COMPLETE: CPT

## 2022-09-12 PROCEDURE — 36415 COLL VENOUS BLD VENIPUNCTURE: CPT

## 2022-09-12 PROCEDURE — 2580000003 HC RX 258: Performed by: INTERNAL MEDICINE

## 2022-09-12 PROCEDURE — A9500 TC99M SESTAMIBI: HCPCS | Performed by: INTERNAL MEDICINE

## 2022-09-12 RX ORDER — ATROPINE SULFATE 0.1 MG/ML
0.5 INJECTION INTRAVENOUS EVERY 5 MIN PRN
Status: DISCONTINUED | OUTPATIENT
Start: 2022-09-12 | End: 2022-09-12 | Stop reason: HOSPADM

## 2022-09-12 RX ORDER — SODIUM CHLORIDE 0.9 % (FLUSH) 0.9 %
5-40 SYRINGE (ML) INJECTION PRN
Status: DISCONTINUED | OUTPATIENT
Start: 2022-09-12 | End: 2022-09-12 | Stop reason: HOSPADM

## 2022-09-12 RX ORDER — NITROGLYCERIN 0.4 MG/1
0.4 TABLET SUBLINGUAL EVERY 5 MIN PRN
Status: DISCONTINUED | OUTPATIENT
Start: 2022-09-12 | End: 2022-09-12 | Stop reason: HOSPADM

## 2022-09-12 RX ORDER — SODIUM CHLORIDE 9 MG/ML
500 INJECTION, SOLUTION INTRAVENOUS CONTINUOUS PRN
Status: DISCONTINUED | OUTPATIENT
Start: 2022-09-12 | End: 2022-09-12 | Stop reason: HOSPADM

## 2022-09-12 RX ORDER — BUPRENORPHINE AND NALOXONE 8; 2 MG/1; MG/1
1 FILM, SOLUBLE BUCCAL; SUBLINGUAL DAILY
Qty: 30 FILM | Refills: 0 | Status: SHIPPED | OUTPATIENT
Start: 2022-09-12 | End: 2022-09-12 | Stop reason: HOSPADM

## 2022-09-12 RX ORDER — AMINOPHYLLINE DIHYDRATE 25 MG/ML
50 INJECTION, SOLUTION INTRAVENOUS PRN
Status: DISCONTINUED | OUTPATIENT
Start: 2022-09-12 | End: 2022-09-12 | Stop reason: HOSPADM

## 2022-09-12 RX ORDER — BUSPIRONE HYDROCHLORIDE 10 MG/1
10 TABLET ORAL 3 TIMES DAILY
Qty: 30 TABLET | Refills: 0 | Status: SHIPPED | OUTPATIENT
Start: 2022-09-12

## 2022-09-12 RX ORDER — METOPROLOL TARTRATE 5 MG/5ML
5 INJECTION INTRAVENOUS EVERY 5 MIN PRN
Status: DISCONTINUED | OUTPATIENT
Start: 2022-09-12 | End: 2022-09-12 | Stop reason: HOSPADM

## 2022-09-12 RX ORDER — ESCITALOPRAM OXALATE 5 MG/1
5 TABLET ORAL DAILY
Qty: 90 TABLET | Refills: 1 | Status: SHIPPED | OUTPATIENT
Start: 2022-09-12

## 2022-09-12 RX ADMIN — TETRAKIS(2-METHOXYISOBUTYLISOCYANIDE)COPPER(I) TETRAFLUOROBORATE 33.5 MILLICURIE: 1 INJECTION, POWDER, LYOPHILIZED, FOR SOLUTION INTRAVENOUS at 08:30

## 2022-09-12 RX ADMIN — ASPIRIN 81 MG 81 MG: 81 TABLET ORAL at 12:42

## 2022-09-12 RX ADMIN — REGADENOSON 0.4 MG: 0.08 INJECTION, SOLUTION INTRAVENOUS at 08:28

## 2022-09-12 RX ADMIN — Medication 100 MG: at 10:47

## 2022-09-12 RX ADMIN — METOPROLOL SUCCINATE 50 MG: 50 TABLET, EXTENDED RELEASE ORAL at 10:47

## 2022-09-12 RX ADMIN — ENOXAPARIN SODIUM 100 MG: 100 INJECTION SUBCUTANEOUS at 12:41

## 2022-09-12 RX ADMIN — BUSPIRONE HYDROCHLORIDE 10 MG: 10 TABLET ORAL at 10:47

## 2022-09-12 RX ADMIN — TETRAKIS(2-METHOXYISOBUTYLISOCYANIDE)COPPER(I) TETRAFLUOROBORATE 15.1 MILLICURIE: 1 INJECTION, POWDER, LYOPHILIZED, FOR SOLUTION INTRAVENOUS at 07:10

## 2022-09-12 RX ADMIN — SODIUM CHLORIDE, PRESERVATIVE FREE 10 ML: 5 INJECTION INTRAVENOUS at 10:53

## 2022-09-12 NOTE — PROGRESS NOTES
Discharge instructions explained to pt. All questions answered. IV removed for discharge. Large amount of bleeding at site. Pressure applied, new dressing placed.  Pt escorted to front entrance, picked up by SO

## 2022-09-12 NOTE — PROGRESS NOTES
[facility]  Office: [address], DO, Jennifercatrachita Arriaga, DO, Echoelyssa Clark, DO, Kristian Rosas, DO, Aleksandra Jones MD, Valente Silvestre MD, Juju Galeano MD, Arnulfo Tipton MD,  Jayro Goodman MD, Jimmie Beach MD, Rossi Arreola, DO, Neal Harris MD,  Linda Claudio MD, Nemesio Guidry MD, Ravi Harris, DO, Rivas Simmons MD, Domo Sparks MD, Keena oTrres MD, Kathryn Reyes MD, Ashwin Urbina MD, Zaida Bhardwaj MD, Canelo Marc DO, Tisha Klein MD, Juanjo Bedolla MD, Alferd Felty, CNP,  Migdalia Cortez, CNP, Ethyl Mercury, CNP, Mckayla Tavera, CNP,  Nadege Frazier, DNP, Rasta Martinez, CNP, Akash Hinojosa, CNP, Mauricio Brittle, CNP, Weston Gallagher, CNP, Anika Velazquez, CNP, Angela Morales PA-C, Lise Chatterjee, CNS, Pranav Castellanos, DNP, Ricardo Casillas, CNP, Tita Pérez, CNP, Lennox Adair, [facility]    Progress Note    9/12/2022    10:09 AM    Name:   Paul Nye  MRN:     [id number]     Acct:      [de-identified]   Room:   [address] [place] [address] Day:  2  Admit Date:  9/10/2022 11:41 AM    PCP:   No primary care provider on file. Code Status:  Full Code    Subjective:     C/C:   Chief Complaint   Patient presents with    Altered Mental Status     LS3       Interval History Status: improved. Patient condition has improved. She has no complaints of any kind today. Education again reinforced on the need for abstinence from illicit substances. Initial EKG portion of stress test is normal.  Nuclear portion pending and if normal patient can be discharged. Patient is highly agitated and aggressive with this provider when I discussed the complications of methamphetamine use. Patient is adamant that she does not use illicit substances.   I suggested that we can contact law enforcement since the only way these substances would be present I talk screen is if she had an intentionally or accidentally ingested them. Patient denied the need for law enforcement involvement. Patient then discontinued communication with this provider. I attempted to explain the patient's condition in detail with her and explained that we would be providing medical treatments to ensure that her cardiac condition improved and that she can be discharged safely. Patient was very aggressive and was demanding to leave the hospital.  Patient is advised that it would be in her best interest to remain until the stress results are back. Brief History:     9/10 - Paul Nye is a 39 y.o.  who presents to our hospital for evaluation of altered mental status. On admission, patient was obtunded and non-responsive. She was found to be hypoglycemic and urine drug screen was positive for amphetamines, marijuana, fentanyl. Patient also had a positive ethanol level. Patient is she was out at a bar with friends and does not specifically recall taking any illicit substances. She currently feels very fatigued but denies any chest pain, nausea, vomiting, diarrhea. Has no chills, cough. On admission, troponin was elevated up from . Patient also noted to have elevated creatinine of 1.4 and high anion gap metabolic acidosis. She denies any sick contacts. Denies any previous medical history. Denies any cocaine use. She does not recall any events leading up to her hospitalization. 9/11-9/12 - Patient condition has improved. She has no complaints of any kind today. Education again reinforced on the need for abstinence from illicit substances. Initial EKG portion of stress test is normal.  Nuclear portion pending and if normal patient can be discharged.     Review of Systems:     Constitutional:  negative for chills, fevers, sweats  Respiratory:  negative for cough, dyspnea on exertion, shortness of breath, wheezing  Cardiovascular:  negative for chest pain, chest pressure/discomfort, lower extremity edema, palpitations  Gastrointestinal:  negative for abdominal pain, constipation, diarrhea, nausea, vomiting  Neurological:  negative for dizziness, headache    Medications: Allergies:  No Known Allergies    Current Meds:   Scheduled Meds:    busPIRone  10 mg Oral TID    aspirin  81 mg Oral Daily    enoxaparin  1 mg/kg SubCUTAneous BID    metoprolol succinate  50 mg Oral Daily    sodium chloride flush  5-40 mL IntraVENous 2 times per day    thiamine  100 mg Oral Daily    sodium chloride flush  5-40 mL IntraVENous 2 times per day     Continuous Infusions:    sodium chloride      sodium chloride      sodium chloride       PRN Meds: sodium chloride flush, sodium chloride, atropine, nitroGLYCERIN, metoprolol, aminophylline, sodium chloride flush, sodium chloride, LORazepam **OR** LORazepam **OR** LORazepam **OR** LORazepam **OR** LORazepam **OR** LORazepam **OR** LORazepam **OR** LORazepam, sodium chloride flush, sodium chloride, acetaminophen, ondansetron **OR** ondansetron    Data:     Past Medical History:   has a past medical history of Eclampsia (toxemia of pregnancy). Social History:   reports that she has been smoking cigarettes. She has a 7.50 pack-year smoking history. She has never used smokeless tobacco. She reports current alcohol use of about 5.0 standard drinks per week. She reports current drug use. Drug: Marijuana Champ Cue). Family History:   Family History   Problem Relation Age of Onset    Amyotrophic lateral sclerosis Mother     Lung Cancer Father     Autism Spectrum Disorder Daughter        Vitals:  BP (!) 144/78   Pulse 83   Temp 98.4 °F (36.9 °C)   Resp 17   Ht 5' 5\" (1.651 m)   Wt 210 lb (95.3 kg)   SpO2 97%   BMI 34.95 kg/m²   Temp (24hrs), Av.5 °F (36.9 °C), Min:97.7 °F (36.5 °C), Max:99 °F (37.2 °C)    Recent Labs     09/10/22  1143   POCGLU 164*       I/O (24Hr):     Intake/Output Summary (Last 24 hours) at 2022 1009  Last data filed at 2022 1828  Gross per 24 hour Intake 950.78 ml   Output --   Net 950.78 ml       Labs:  Hematology:  Recent Labs     09/10/22  1146 09/10/22  1502 09/12/22  0535   WBC 23.0*  --  6.5   RBC 4.63  --  4.47   HGB 14.3  --  13.7   HCT 45.1  --  42.7   MCV 97.4  --  95.5   MCH 30.9  --  30.6   MCHC 31.7  --  32.1   RDW 12.8  --  12.5     --  192   MPV 9.5  --  10.5   INR 1.2 1.3  --      Chemistry:  Recent Labs     09/10/22  1146 09/10/22  1338 09/10/22  1630 09/10/22  1825 09/10/22  1945 09/12/22  0535     --   --   --   --  139   K 4.9  --   --   --   --  4.3     --   --   --   --  102   CO2 19*  --   --   --   --  28   GLUCOSE 164*  --   --   --   --  82   BUN 11  --   --   --   --  7   CREATININE 1.41*  --   --   --   --  0.66   MG 2.7*  --   --   --   --   --    ANIONGAP 19*  --   --   --   --  9   LABGLOM 42*  --   --   --   --  >60   GFRAA 51*  --   --   --   --  >60   CALCIUM 9.0  --   --   --   --  8.9   PHOS 9.5*  --   --   --  4.2  --    TROPHS 40* 94* 176* 181*  --   --      Recent Labs     09/10/22  1142 09/10/22  1143   AMMONIA 41  --    POCGLU  --  164*     ABG:No results found for: POCPH, PHART, PH, POCPCO2, SIO9JLW, PCO2, POCPO2, PO2ART, PO2, POCHCO3, ASR8IWF, HCO3, NBEA, PBEA, BEART, BE, THGBART, THB, YRN2VPU, GDSV4JJQ, E4FPYSCB, O2SAT, FIO2  Lab Results   Component Value Date/Time    SPECIAL 2ML [facility] 09/10/2022 07:45 PM     Lab Results   Component Value Date/Time    CULTURE NO GROWTH 1 DAY 09/10/2022 07:45 PM       Radiology:  CT HEAD WO CONTRAST    Result Date: 9/10/2022  No acute intracranial abnormality. XR CHEST PORTABLE    Result Date: 9/10/2022  Mild pulmonary congestion.        Physical Examination:        General appearance:  alert, cooperative and no distress  Mental Status:  oriented to person, place and time and normal affect  Lungs:  clear to auscultation bilaterally, normal effort  Heart:  regular rate and rhythm, no murmur  Abdomen:  soft, nontender, nondistended, normal bowel sounds, no masses, hepatomegaly, splenomegaly  Extremities:  no edema, redness, tenderness in the calves  Skin:  no gross lesions, rashes, induration    Assessment:        Hospital Problems             Last Modified POA    * (Principal) Drug overdose, accidental or unintentional, initial encounter 9/10/2022 Yes    Intoxication due to misuse of recreational drug ([address] 9/10/2022 Yes    Marijuana abuse 9/10/2022 Yes    Amphetamine intoxication with complication ([address] 9/32/3670 Yes    Alcohol intoxication ([address] 9/10/2022 Yes    Hyperphosphatemia 9/10/2022 Yes    Type 2 acute myocardial infarction ([address] 9/10/2022 Yes    Leukocytosis 9/10/2022 Yes    Hypoglycemia 9/10/2022 Yes    Class 1 obesity due to excess calories with serious comorbidity and body mass index (BMI) of 34.0 to 34.9 in adult 9/10/2022 Yes       Plan:        Unintentional overdose due to recreational drug misuse with alcohol intoxication  Education on abstinence  Chest pain with elevated troponin  Continue heparin drip until discontinued by cardiology  Cardiac stress test underway and if nuclear portion is negative patient can be discharged at cardiology's recommendation    KRIS Bui NP  9/12/2022  10:09 AM

## 2022-09-12 NOTE — PROGRESS NOTES
Pt removes telemetry monitoring. States she is going home, wants to know when she can go. Discharge is pending stress test and echo results and cardiology. Explained this to patient.

## 2022-09-12 NOTE — CARE COORDINATION
met with patient at bedside to discuss substance abuse treatment. Patient reports that she has been sober 5 years, and \"was just drunk\". She declined any resources or information for treatment.

## 2022-09-12 NOTE — PROCEDURES
[address] [facility] [address]                 [address] [facility], [address] [place] [address]                              CARDIAC STRESS TEST    PATIENT NAME: Meagan Campos                     :        1985  MED REC NO:   [id number]                             ROOM:       1001  ACCOUNT NO:   [de-identified]                           ADMIT DATE: 09/10/2022  PROVIDER:     Richelle Otoole MD    DATE OF STUDY:  2022    LEXISCAN PHARMACOLOGIC STRESS TEST    ATTENDING PROVIDER:  Sang Gray MD    PERFORMING PHYSICIAN: Matilda Pal MD    Indication :   Chest pain. The baseline blood pressure was 137/80 mmHg with a heart rate of 71. [place] was infused using the standard protocol. The patient tolerated the procedure well with :  No complaints. With Lexiscan infusion, there were no significant changes in blood  pressure. The baseline EKG demonstrated: Sinus rhythm. Normal EKG. Lexiscan infusion did not demonstrate ST changes diagnostic of ischemia. No atrial or ventricular ectopy was noted during the study. EKG response is :  Negative for ischemic changes. IMPRESSION:  Negative EKG portion of pharmacologic stress test for ischemia. Nuclear portion reported separately.         Maria De Jesus Belcher MD    D: 2022 9:56:16       T: 2022 9:58:43     BAO/IGOR_SHANNON  Job#: [id number]     Doc#: Unknown

## 2022-09-12 NOTE — CARE COORDINATION
Discharge planning    Met with patient to offer substance cessation resources and she refused. Stating she is a recovering addict and not taking anything. Patient admitted with positive UA for amphetamines, cannabinoids and fentanyl.       Awaiting stress test results and anticipate discharge once cleared per cardiology

## 2022-09-12 NOTE — PLAN OF CARE
Problem: Discharge Planning  Goal: Discharge to home or other facility with appropriate resources  Outcome: Adequate for Discharge

## 2022-09-12 NOTE — PROGRESS NOTES
Section of Cardiology  Progress Note      Date:  9/12/2022  Patient: Liz Fontaine  Admission:  9/10/2022 11:41 AM  Admit DX: Elevated troponin [R77.8]  Drug overdose, accidental or unintentional, initial encounter [T50.901A]  Altered mental status, unspecified altered mental status type [R41.82]  Age:  40 y.o., 1985     LOS: 2 days     Reason for evaluation:   Chest Pain, Elevated troponins      SUBJECTIVE:     The patient was seen and examined. Notes and labs reviewed. No issues overnight. No further episodes of CP  No further labs drawn  Pt is here for stress testing today  No SOB, edema, palpitations      OBJECTIVE:      EXAM:   Vitals:    VITALS:  BP (!) 144/78   Pulse 83   Temp 98.4 °F (36.9 °C)   Resp 17   Ht 5' 5\" (1.651 m)   Wt 210 lb (95.3 kg)   SpO2 97%   BMI 34.95 kg/m²   24HR INTAKE/OUTPUT:    Intake/Output Summary (Last 24 hours) at 9/12/2022 0840  Last data filed at 9/11/2022 1828  Gross per 24 hour   Intake 950.78 ml   Output --   Net 950.78 ml       CONSTITUTIONAL:  awake, alert, cooperative, no apparent distress, and appears stated age. HEENT: Normal jugular venous pulsations, no carotid bruits. LUNGS: Good respiratory effort On auscultation: clear to auscultation bilaterally  CARDIOVASCULAR:  Normal apical impulse, regular rate and rhythm, normal S1 and S2, no S3 or S4, and no murmur or rub noted. ABDOMEN: Soft, nontender, nondistended. Bowel sounds present. No masses or tenderness. No bruit. SKIN: Warm and dry. EXTREMITIES:No lower extremity edema.      Current Inpatient Medications:   busPIRone  10 mg Oral TID    aspirin  81 mg Oral Daily    enoxaparin  1 mg/kg SubCUTAneous BID    metoprolol succinate  50 mg Oral Daily    sodium chloride flush  5-40 mL IntraVENous 2 times per day    thiamine  100 mg Oral Daily    sodium chloride flush  5-40 mL IntraVENous 2 times per day       IV Infusions (if any):   sodium chloride      sodium chloride      sodium chloride Diagnostics:   Telemetry:NSR    ECHO: Pending    Stress Test: pending    Cardiac Angiography: not obtained. Labs:   CBC:  Recent Labs     09/10/22  1146 22  0535   WBC 23.0* 6.5   HGB 14.3 13.7   HCT 45.1 42.7    192     Magnesium:  Recent Labs     09/10/22  1146   MG 2.7*     BMP:  Recent Labs     09/10/22  1146      K 4.9   CALCIUM 9.0   CO2 19*   BUN 11   CREATININE 1.41*   LABGLOM 42*   GLUCOSE 164*     BNP:No results for input(s): BNP, PROBNP in the last 72 hours. PT/INR:  Recent Labs     09/10/22  1146 09/10/22  1502   PROTIME 15.3* 16.5*   INR 1.2 1.3     APTT:  Recent Labs     09/10/22  1146 09/10/22  1502   APTT 34.4* 31.9     CARDIAC ENZYMES:  Recent Labs     09/10/22  1338 09/10/22  1630 09/10/22  1825   TROPHS 94* 176* 181*     FASTING LIPID PANEL:No results found for: HDL, LDLDIRECT, LDLCALC, TRIG  LIVER PROFILE:No results for input(s): AST, ALT, LABALBU, ALKPHOS, BILITOT, BILIDIR, IBILI, PROT, GLOB, ALBUMIN in the last 72 hours. ASSESSMENT:  Atypical chest pain - no recurrence, no ischemic changes on EKG  Elevated troponin may be related to acute kidney injury/drug overdose  Dyspnea on exertion is chronic  Altered mental status/polysubstance drug overdose, managed by others  Acute kidney injury, managed by others  Leukocytosis, managed by others  Mother  in her sleep in her 52's      PLAN:  Continue current medications. Pt seen at nuclear stress testing. No acute issues and is CP free. No further labs drawn since yesterday  Once ECHO and Nuclear completed, if normal, pt can be discharged on current therapy without further testing  Continue abstinence of drugs  Cont Toprol-XL and follow up as outpatient as needed        Discussed with patient and nursing.     Paris Worrell MD

## 2022-09-12 NOTE — DISCHARGE SUMMARY
[facility]  Office: [address], DO, Sulaiman Rodriguez, DO, Nery Peck, DO, Makenzie Negro Blood, DO, Hermilo Forbes MD, Ivette Mendoza MD, Delfina Treviño MD, Rosanna Jaquez MD,  Josephine Varghese MD, Med Romero MD, Hazel Alcocer, DO, Holli Vasquez MD,  Zbigniew Lundberg MD, Edwige Albarado MD, India Auguste, DO, Mile Noguera MD, Robert Parsons MD, Fahad Sidhu MD, Val Butler MD, Mike Hernandez MD, Luz Stewart MD, Julia Caldwell DO, Blayne Ramirez MD, Bre Frias MD, Shaina Hair, CNP,  Antonina Donovan, CNP, Sudhir Fields, CNP, Amanuel Patten, CNP,  Yolie Slaughter, [facility], Juliann Baldwin, CNP, Hansa Hassan, CNP, Jennifer Keen, CNP, Rich Scott, CNP, Tomas Hernandez, CNP, Aakash Macias PA-C, Herrera Dietrich, CNS, Rebeca Martinez, [facility], Wayne Walsh, CNP, Marina Jasso, [facility], Yuli Ely, [facility]    Discharge Summary     Patient ID: Vernon Hall  :  1985   MRN: [id number]     ACCOUNT:  [de-identified]   Patient's PCP: No primary care provider on file. Admit Date: 9/10/2022   Discharge Date: 2022     Length of Stay: 2  Code Status:  Full Code  Admitting Physician: Robert Parsons MD  Discharge Physician: Robert Parsons MD     Active Discharge Diagnoses:     Hospital Problem Lists:  Principal Problem:    Drug overdose, accidental or unintentional, initial encounter  Active Problems:    Intoxication due to misuse of recreational drug ([address]    Marijuana abuse    Amphetamine intoxication with complication ([address]    Alcohol intoxication ([address]    Hyperphosphatemia    Type 2 acute myocardial infarction ([address]    Leukocytosis    Hypoglycemia    Class 1 obesity due to excess calories with serious comorbidity and body mass index (BMI) of 34.0 to 34.9 in adult  Resolved Problems:    * No resolved hospital problems.  *      Admission Condition:  good     Discharged Condition: good    Hospital Stay:     Hospital Course:  Demetrio Menchaca is a 40 y.o. female who was admitted for the management of  Drug overdose, accidental or unintentional, initial encounter , presented to ER with Altered Mental Status (LS3/)    40 F admitted with AMS and found to have fentany, amphetamine, marijuana in UDS    Troponin was drawn in ED and was elevated    She was started on heparin gtt - for ACS    Stress testing ordered, and it was negative. She declined suboxone therapy as she adamantly denied being a current drug user    She was also started on lexapro, buspar, and suboxone 8 mg was sent    Significant therapeutic interventions:     Significant Diagnostic Studies:   Labs / Micro:  BMP:    Lab Results   Component Value Date/Time    GLUCOSE 82 09/12/2022 05:35 AM     09/12/2022 05:35 AM    K 4.3 09/12/2022 05:35 AM     09/12/2022 05:35 AM    CO2 28 09/12/2022 05:35 AM    ANIONGAP 9 09/12/2022 05:35 AM    BUN 7 09/12/2022 05:35 AM    CREATININE 0.66 09/12/2022 05:35 AM    BUNCRER 11 09/12/2022 05:35 AM    CALCIUM 8.9 09/12/2022 05:35 AM    LABGLOM >60 09/12/2022 05:35 AM    GFRAA >60 09/12/2022 05:35 AM    GFR      09/12/2022 05:35 AM        Radiology:  CT HEAD WO CONTRAST    Result Date: 9/10/2022  No acute intracranial abnormality. XR CHEST PORTABLE    Result Date: 9/10/2022  Mild pulmonary congestion. Consultations:    Consults:     Final Specialist Recommendations/Findings:   IP CONSULT TO CARDIOLOGY  IP CONSULT TO INTERNAL MEDICINE  IP CONSULT TO SOCIAL WORK      The patient was seen and examined on day of discharge and this discharge summary is in conjunction with any daily progress note from day of discharge.     Discharge plan:     Disposition: Home    Physician Follow Up:     Hetal Potter MD  [address]  [phone]    Follow up  As needed       Requiring Further Evaluation/Follow Up POST HOSPITALIZATION/Incidental Findings:     Diet: cardiac diet    Activity: As tolerated    Instructions to Patient:     Discharge Medications:      Medication List        START taking these medications      busPIRone 10 MG tablet  Commonly known as: BUSPAR  Take 1 tablet by mouth 3 times daily     escitalopram 5 MG tablet  Commonly known as: LEXAPRO  Take 1 tablet by mouth daily               Where to Get Your Medications        These medications were sent to [facility], [address], [address] [place] [address]      Phone: [phone]   busPIRone 10 MG tablet  escitalopram 5 MG tablet         No discharge procedures on file. Time Spent on discharge is  32 mins in patient examination, evaluation, counseling as well as medication reconciliation, prescriptions for required medications, discharge plan and follow up. Electronically signed by   Fang Turenr MD  9/12/2022  2:15 PM      Thank you Dr. Sky Child primary care provider on file. for the opportunity to be involved in this patient's care.

## 2022-09-13 NOTE — ADT AUTH CERT
Last H&P Note    H&P by Ines Tse DO at 9/10/2022  7:10 PM    Author: Ines Tse DO Specialty: Hospitalist, Internal Medicine Author Type: Physician   Filed: 9/10/2022  7:23 PM Date of Service: 9/10/2022  7:10 PM Status: Signed   : Ines Tse DO (Physician)   Expand All Collapse All  [facility]  Office: [address], DO, Argenis Ruano DO, Bailey Lolyd Blood, DO, Addie Collins MD, Marilou Mccray MD, Khanh Cabrera MD, Jimenez Yoon MD,  Fer Hicks MD, Navid Fregoso MD, Christoph Baker DO, Blake Brown MD,  Vasiliy Serrano DO, Tatum Reilly MD, Sammie Pringle MD, Jeffrey Cotton DO, Anupam Tierney MD, Mercy Mukherjee MD, Mercy Steele MD, Gayatri Bar MD, Torri Rivera MD, Jayashree Currie MD, Juana Skelton DO, Zelda Hopkins MD, Winnie Metz MD, Aileen Rm, CNP,  Bennie Winters, CNP, Maryse Angry, CNP, Adonis Barrera, CNP,  Cleone Denver, [facility], Griselda Scruggs, CNP, Ray Marcus, CNP, Duran Collier, CNP, Yaya Ledezma, CNP, Natalie Baird, CNP, Juan Carlos Paredes PA-C, Jay Bennett, CNS, Sinai Tarango, [facility], Francheska Amador, CNP, Ricardo Breaux, CNP, Rehana Gleason, [id number]     HISTORY AND PHYSICAL EXAMINATION            Date:                            9/10/2022  Patient name:              Jessee Will  Date of admission:      9/10/2022 11:41 AM  MRN:                           [id number]  Account:                      [id number]  YOB: 1986  PCP:                            No primary care provider on file.   Room:                          [place] [address]  Code Status:               No Order     Chief Complaint:           Chief Complaint   Patient presents with    Altered Mental Status       LS3            History Obtained From:      patient     History of Present Illness:      Jessee Will is a 39 y.o.  who presents to our hospital for evaluation of altered mental status. On admission, patient was obtunded and non-responsive. She was found to be hypoglycemic and urine drug screen was positive for amphetamines, marijuana, fentanyl. Patient also had a positive ethanol level. Patient is she was out at a bar with friends and does not specifically recall taking any illicit substances. She currently feels very fatigued but denies any chest pain, nausea, vomiting, diarrhea. Has no chills, cough. On admission, troponin was elevated up from . Patient also noted to have elevated creatinine of 1.4 and high anion gap metabolic acidosis. She denies any sick contacts. Denies any previous medical history. Denies any cocaine use. She does not recall any events leading up to her hospitalization. Past Medical History:      Past Medical History        Past Medical History:   Diagnosis Date    Eclampsia (toxemia of pregnancy)              Past Surgical History:      Past Surgical History         Past Surgical History:   Procedure Laterality Date    HERNIA REPAIR   2010            Medications Prior to Admission:      Home Medications   Prior to Admission medications    Not on File            Allergies:      Patient has no known allergies. Social History:      Tobacco:    reports that she has been smoking cigarettes. She has a 7.50 pack-year smoking history. She has never used smokeless tobacco.  Alcohol:      reports current alcohol use of about 5.0 standard drinks per week. Drug Use:  reports current drug use. Drug: Marijuana Mazulemata Lorena). Family History:      Family History         Family History   Problem Relation Age of Onset    Amyotrophic lateral sclerosis Mother      Lung Cancer Father      Autism Spectrum Disorder Daughter              Review of Systems:      Positive and Negative as described in HPI.      CONSTITUTIONAL:  negative for fevers, chills, sweats, fatigue, weight loss  HEENT:  negative for vision, hearing changes, runny nose, throat pain  RESPIRATORY:  negative for shortness of breath, cough, congestion, wheezing  CARDIOVASCULAR:  negative for chest pain, palpitations  GASTROINTESTINAL:  negative for nausea, vomiting, diarrhea, constipation, change in bowel habits, abdominal pain   GENITOURINARY:  negative for difficulty of urination, burning with urination, frequency   INTEGUMENT:  negative for rash, skin lesions, easy bruising   HEMATOLOGIC/LYMPHATIC:  negative for swelling/edema   ALLERGIC/IMMUNOLOGIC:  negative for urticaria , itching  ENDOCRINE:  negative increase in drinking, increase in urination, hot or cold intolerance  MUSCULOSKELETAL:  negative joint pains, muscle aches, swelling of joints  NEUROLOGICAL:  negative for headaches, dizziness, lightheadedness, numbness, pain, tingling extremities  BEHAVIOR/PSYCH:  negative for depression, anxiety     Physical Exam:   /67   Pulse (!) 105   Temp 97.1 °F (36.2 °C) (Axillary)   Resp 15   Ht 5' 5\" (1.651 m)   Wt 210 lb (95.3 kg)   SpO2 97%   BMI 34.95 kg/m²   Temp (24hrs), Av.1 °F (36.2 °C), Min:97.1 °F (36.2 °C), Max:97.1 °F (36.2 °C)         Recent Labs     09/10/22  1143   POCGLU 164*      No intake or output data in the 24 hours ending 09/10/22 1923     General Appearance:  alert, well appearing, and in no acute distress  Mental status: oriented to person, place, and time  Head:  normocephalic, atraumatic  Eye: no icterus, redness, pupils equal and reactive, extraocular eye movements intact, conjunctiva clear  Ear: normal external ear, no discharge, hearing intact  Nose:  no drainage noted  Mouth: mucous membranes moist  Neck: supple, no carotid bruits, thyroid not palpable  Lungs: Bilateral equal air entry, clear to auscultation, no wheezing, rales or rhonchi, normal effort  Cardiovascular: normal rate, regular rhythm, no murmur, gallop, rub.   Abdomen: Soft, nontender, nondistended, normal bowel sounds, no hepatomegaly or splenomegaly  Neurologic: There are no new focal motor or sensory deficits, normal muscle tone and bulk, no abnormal sensation, normal speech, cranial nerves II through XII grossly intact  Skin: No gross lesions, rashes, bruising or bleeding on exposed skin area  Extremities:  peripheral pulses palpable, no pedal edema or calf pain with palpation  Psych: normal affect      Investigations:       Laboratory Testing:  Recent Results          Recent Results (from the past 24 hour(s))   Ammonia     Collection Time: 09/10/22 11:42 AM   Result Value Ref Range     Ammonia 41 11 - 51 umol/L   POC Glucose Fingerstick     Collection Time: 09/10/22 11:43 AM   Result Value Ref Range     POC Glucose 164 (H) 65 - 105 mg/dL   EKG 12 Lead     Collection Time: 09/10/22 11:44 AM   Result Value Ref Range     Ventricular Rate 113 BPM     Atrial Rate 113 BPM     P-R Interval 150 ms     QRS Duration 96 ms     Q-T Interval 348 ms     QTc Calculation (Bazett) 477 ms     P Axis 54 degrees     R Axis 70 degrees     T Axis 6 degrees   Troponin     Collection Time: 09/10/22 11:46 AM   Result Value Ref Range     Troponin, High Sensitivity 40 (H) 0 - 14 ng/L   APTT     Collection Time: 09/10/22 11:46 AM   Result Value Ref Range     PTT 34.4 (H) 23.9 - 33.8 sec   Protime-INR     Collection Time: 09/10/22 11:46 AM   Result Value Ref Range     Protime 15.3 (H) 11.5 - 14.2 sec     INR 1.2     Phosphorus     Collection Time: 09/10/22 11:46 AM   Result Value Ref Range     Phosphorus 9.5 (HH) 2.6 - 4.5 mg/dL   Magnesium     Collection Time: 09/10/22 11:46 AM   Result Value Ref Range     Magnesium 2.7 (H) 1.6 - 2.6 mg/dL   Basic Metabolic Panel     Collection Time: 09/10/22 11:46 AM   Result Value Ref Range     Glucose 164 (H) 70 - 99 mg/dL     BUN 11 6 - 20 mg/dL     Creatinine 1.41 (H) 0.50 - 0.90 mg/dL     Bun/Cre Ratio 8 (L) 9 - 20     Calcium 9.0 8.6 - 10.4 mg/dL     Sodium 139 135 - 144 mmol/L     Potassium 4.9 3.7 - 5.3 mmol/L     Chloride 101 98 - 107 mmol/L     CO2 19 (L) 20 - 31 mmol/L     Anion Gap 19 (H) 9 - 17 mmol/L     GFR Non-African American 42 (L) >60 mL/min     GFR  51 (L) >60 mL/min     GFR Comment          CBC with Auto Differential     Collection Time: 09/10/22 11:46 AM   Result Value Ref Range     WBC 23.0 (H) 3.5 - 11.3 k/uL     RBC 4.63 3.95 - 5.11 m/uL     Hemoglobin 14.3 11.9 - 15.1 g/dL     Hematocrit 45.1 36.3 - 47.1 %     MCV 97.4 82.6 - 102.9 fL     MCH 30.9 25.2 - 33.5 pg     MCHC 31.7 28.4 - 34.8 g/dL     RDW 12.8 11.8 - 14.4 %     Platelets 381 044 - 211 k/uL     MPV 9.5 8.1 - 13.5 fL     NRBC Automated 0.0 0.0 per 100 WBC     Seg Neutrophils 92 (H) 36 - 66 %     Lymphocytes 4 (L) 24 - 44 %     Monocytes 3 1 - 7 %     Eosinophils % 0 (L) 1 - 4 %     Basophils 0 %     Immature Granulocytes 1 (H) 0 %     Segs Absolute 21.16 (H) 1.8 - 7.7 k/uL     Absolute Lymph # 0.92 (L) 1.0 - 4.8 k/uL     Absolute Mono # 0.69 0.2 - 0.8 k/uL     Absolute Eos # 0.00 0.0 - 0.4 k/uL     Basophils Absolute 0.00 0.0 - 0.2 k/uL     Absolute Immature Granulocyte 0.23 0.00 - 0.30 k/uL   Ethanol     Collection Time: 09/10/22 11:46 AM   Result Value Ref Range     Ethanol 99 (H) <10 mg/dL     Ethanol percent 0.099 (H) <0.010 %   Drug Scr, Abuse, Ur     Collection Time: 09/10/22 11:56 AM   Result Value Ref Range     Amphetamine Screen, Ur POSITIVE (A) NEGATIVE     Barbiturate Screen, Ur NEGATIVE NEGATIVE     Benzodiazepine Screen, Urine NEGATIVE NEGATIVE     Cocaine Metabolite, Urine NEGATIVE NEGATIVE     Methadone Screen, Urine NEGATIVE NEGATIVE     Opiates, Urine NEGATIVE NEGATIVE     Phencyclidine, Urine NEGATIVE NEGATIVE     Cannabinoid Scrn, Ur POSITIVE (A) NEGATIVE     Oxycodone Screen, Ur NEGATIVE NEGATIVE     Fentanyl, Ur POSITIVE (A) NEGATIVE     Test Information           Assay provides medical screening only.   The absence of expected drug(s) and/or metabolite(s) may indicate diluted or adulterated urine, limitations of testing or admission. Keep goal between 140 and 180  Leukocytosis likely reactive, doubt any infectious process. Will monitor. Check blood cultures if symptoms return. Keep off of antibiotics  Telemetry   PTOT  Activity as tolerated. Consultations:   IP CONSULT TO CARDIOLOGY  IP CONSULT TO INTERNAL MEDICINE  IP CONSULT TO SOCIAL WORK      Patient is admitted as inpatient status because of co-morbidities listed above, severity of signs and symptoms as outlined, requirement for current medical therapies and most importantly because of direct risk to patient if care not provided in a hospital setting. Expected length of stay > 48 hours. Gail Christopher DO  9/10/2022  7:23 PM     Copy sent to Dr. Liz Apodaca primary care provider on file.              Utilization Reviews       Drug Ingestion or Overdose - Care Day 2 (9/11/2022) by Radha Tripathi RN       Review Entered Review Status   9/12/2022 09:11 Completed      Criteria Review      Care Day: 2 Care Date: 9/11/2022 Level of Care: Intermediate Care    Guideline Day 2    Clinical Status    (X) * Mental status at baseline    9/12/2022 9:11 AM EDT by Karyle Louder      Awake, alert, oriented    (X) * Hemodynamic stability    9/12/2022 9:11 AM EDT by Serafin Brain 99  ,93  /89    (X) * Dangerous arrhythmia absent    (X) * Tachypnea absent    9/12/2022 9:11 AM EDT by Karyle Louder      RR 18    (X) * Hypoxemia absent    9/12/2022 9:11 AM EDT by Karyle Louder      SPo2 97% RA    (X) * Toxic manifestations absent or managed    ( ) * Need for continued inpatient monitoring absent    9/12/2022 9:11 AM EDT by Valentina Perez on seizure/fall precaution    (X) * Psychiatric risk status acceptable    9/12/2022 9:11 AM EDT by Karyle Louder      none noted risk    ( ) * Discharge plans and education understood    Activity    ( ) * Ambulatory or acceptable for next level of care    9/12/2022 9:11 AM EDT by Karyle Louder      up with ischemia. If significant ischemia is found a left heart catheterization, possible PCI will be recommended         Medications:   asa 81 mg DAILY PO   BUSPAR 10 mg tid DAILY PO   LOVENOX 100 mg BID  DAILY SC   TOPROL XL 50 mg  DAILY PO   heparin 25,000u gtt cont iv dc   heparin 2,000U PRN IV x 1 dc         CM Assessments or Notes:   Discharge Plan:    Met with patient to complete discharge plan. Patient lives at home with a roommate [facility]. Patient is not wanting to answer any questions. Wants to go home or go to sleep   Patient unsure of her insurance states it is either [employer] or [employer]. Left message for HELP as face sheet is showing no insurance. Asked if patient had insurance card with her and she ignored the writer. Patient is not interested in any further discussion.  Will attempt tomorrow to see if interested in any rehab resources  as she was admitted with unintentional drug overdose        Drug Ingestion or Overdose - Care Day 1 (9/10/2022) by Ron Burger RN       Review Entered Review Status   9/12/2022 08:47 Completed      Criteria Review      Care Day: 1 Care Date: 9/10/2022 Level of Care: Intermediate Care    Guideline Day 1    Level Of Care    (X) ICU, intermediate care, or telemetry    9/12/2022 8:47 AM EDT by Myra Nevarez      Intermediate Care    Clinical Status    (X) * Clinical Indications met    9/12/2022 8:47 AM EDT by Myra Nevarez      tachypneic ; + Urine drg screen ; AMS upon adm    (X) Possible tachycardia, confusion, hypotension, or other toxicity    9/12/2022 8:47 AM EDT by Thalia George [phone],393 ; /100,131/101,152/100    Activity    ( ) Bed rest, increased activity as tolerated    9/12/2022 8:47 AM EDT by Myra Nevarez      Up with assistance    Routes    (X) IV fluids    9/12/2022 8:47 AM EDT by Myra Nevarez      -0.9 % nacl bolus 1,000 mL ONCE IV x 2  -0.9% nacl infusion 75ml/hr cont iv    (X) IV medications    9/12/2022 8:47 AM EDT by When compared with ECG of 10-SEP-2022 11:43, (unconfirmed)   Sinus rhythm has replaced Electronic ventricular pacemaker      EKG 12-LEAD: Rpt   Atrial Rate: 105    Sinus tachycardia   Low voltage QRS   Nonspecific T wave abnormality   Abnormal ECG   No previous ECGs available      EKG 12-LEAD: Rpt   Atrial Rate: 106    Sinus tachycardia   Low voltage QRS   Nonspecific T wave abnormality   Abnormal ECG   When compared with ECG of 10-SEP-2022 11:44, (unconfirmed)   No significant change was found      Glucose, Random: 164 (H)   POC Glucose: 164 (H)   CO2: 19 (L)   BUN,BUNPL: 11   Creatinine: 1.41 (H)   Bun/Cre Ratio: 8 (L)   Anion Gap: 19 (H)   GFR Non-: 42 (L)   GFR : 51 (L)   Magnesium: 2.7 (H)   Glucose, Random: 164 (H)   Phosphorus: 9.5 (HH)   Lactic Acid: 2.0      Troponin, High Sensitivity: 40 (H) , 94 (HH) ,    176 (HH) , 181 (HH)      ETHANOL,ETHA: 99 (H)   Ethanol percent: 0.099 (H)      Amphetamine Screen, Ur: POSITIVE ! Cannabinoid Scrn, Ur: POSITIVE ! Fentanyl, Ur: POSITIVE ! WBC: 23.0 (H)      Prothrombin Time: 15.3 (H),16.5 (H)   INR: 1.2,1.3   PTT: 34.4 (H),31.9   Anti-XA Unfrac Heparin: 0.11 (L)      CULTURE, BLOOD 1: NO GROWTH 1 DAY   CULTURE, BLOOD 1: NO GROWTH 1 DAY         ED treatment:   -0.9 % nacl bolus 1,000 mL iv x 2   -heparin 4,000U iv   -heparin 25,000u infusion iv   -ZOFRAN 4 mg iv   -thiamine mononitrate 100 mg po         Admission Diagnosis/Op Note:   Altered mental status likely secondary to polysubstance abuse/intoxication   Elevated troponin likely due to type II MI         Pertinent Medical History:   No pertinent med hx          Physical Exam:   Constitutional:        General: She is not in acute distress. Appearance: Normal appearance. She is not ill-appearing or toxic-appearing. HENT:       Head: Normocephalic and atraumatic.        Right Ear: External ear normal.       Left Ear: External ear normal.       Nose: No congestion or rhinorrhea. Eyes:       Extraocular Movements: Extraocular movements intact. Pupils: Pupils are equal, round, and reactive to light. Cardiovascular:       Rate and Rhythm: Normal rate and regular rhythm. Pulses: Normal pulses. Heart sounds: Normal heart sounds. Pulmonary:       Effort: Pulmonary effort is normal. No respiratory distress. Breath sounds: Normal breath sounds. No wheezing. Abdominal:       General: Bowel sounds are normal. There is no distension. Palpations: Abdomen is soft. Tenderness: There is no abdominal tenderness. Musculoskeletal:          General: No deformity or signs of injury. Normal range of motion. Cervical back: No rigidity or tenderness. Skin:      General: Skin is warm and dry. Neurological:       Mental Status: She is alert. GCS: GCS eye subscore is 3. GCS verbal subscore is 4. GCS motor subscore is 6. Psychiatric:          Behavior: Behavior is slowed. Thought Content: Thought content does not include suicidal ideation. MD Consults/Assessments & Plans:   ##H&P   Plan: 1. Altered mental status likely secondary to polysubstance abuse/intoxication. Patient had multiple positive illicit substances in urine and appeared somnolent on admission. 2.Elevated troponin likely due to type II MI. Continue heparin drip for now, cardiology consulted for further recommendations. 3.Monitor for signs of alcohol/drug withdrawal.  Will order benzodiazepine as needed. 4.Repeat phos level STAT   5. Monitor glucose, patient was hypoglycemic on admission. Keep goal between 140 and 180   6. Leukocytosis likely reactive, doubt any infectious process. Will monitor. Check blood cultures if symptoms return. Keep off of antibiotics   7. Telemetry    8. PTOT         Medications:   heparin 4,000U  ONCE IV   heparin 25,000u gtt cont iv    heparin 2,000U PRN IV x 1   thiamine mononitrate 100 mg DAILY PO         Orders: Fall/Seizure precautions   Alcohol and or drug assessment    Initiate Oxygen Therapy Protocol         PT/OT/SLP/CM Assessments or Notes: ##Rn Notes   Pt upset with NPO status, no evidence of education understood at this time    Pt threatening to leave AMA , pt removed all telemetry \"to get us to get pt something to drink,\". Drug Ingestion or Overdose - Clinical Indications for Admission to Inpatient Care by Aron Myers RN       Review Entered Review Status   9/12/2022 08:41 Completed      Criteria Review      Clinical Indications for Admission to Inpatient Care    Most Recent : Charla Hutchins Most Recent Date: 9/12/2022 8:41 AM EDT    (X) Admission is indicated for  1 or more  of the following  (1) (2) (3) (4) (5) (6) (7):       (X) Potential for Dangerous arrhythmia requiring telemetry monitoring beyond observation care       time frame (eg, overdose with known arrhythmogenic agent, such as tricyclic antidepressant,       phenothiazine, hydroxychloroquine, or antiarrhythmic; drug-induced QTc greater than 500       msec or QRS greater than 120 msec) (8) (9) (10)       9/12/2022 8:41 AM EDT by [facility]         + Urine drug screen for amphetamines, marijuana, fentanyl ; RR 22,26 ;   On admission, patient was obtunded and non-responsive

## 2022-09-15 LAB
CULTURE: NORMAL
CULTURE: NORMAL
Lab: NORMAL
Lab: NORMAL
SPECIMEN DESCRIPTION: NORMAL
SPECIMEN DESCRIPTION: NORMAL

## 2022-09-27 NOTE — PROGRESS NOTES
Physician Progress Note      PATIENTRomana Just  [facility] #:                  [id number]  :                       1985  ADMIT DATE:       9/10/2022 11:41 AM  DISCH DATE:        2022 2:08 PM  RESPONDING  PROVIDER #:        Juju Be DO          QUERY TEXT:    Patient admitted with drug overdose . Noted documentation of type II MI in [facility]   on 9/10/22. In order to support the diagnosis of type II MI, please refer to   4th universal definition of MI below and include additional clinical   indicators in your documentation. ? Or please document if the diagnosis of type   II MI has been ruled out after study. ? The medical record reflects the following:  ?? Risk Factors: Drug use  ? ? Clinical Indicators: Troponin og , 2d Echo shows no WMA, Per   cardiology consult note \"Her chest pain is atypical and troponin rise may be   related to acute kidney injury. \", stress test WNL, no ischemic changes on EKG  ? ? Treatment: Heparin drip, Cardiology consult, 2d Echo, Stress test    Thank you,  Summer LAWRENCE Rn    Fourth Universal Definition of Myocardial Infarction:  Clearly separates MI   from myocardial injury. Patients with elevated blood troponin levels but   without clinical evidence of ischemia are said to have had a myocardial   injury. ? To have a myocardial infarction requires both an elevated troponin   blood test along with at least one of the following:  - Symptoms of acute myocardial ischemia (Types 1 - 5 MI)  - Clinical evidence of ischemia, as evidenced in an electrocardiogram (EKG)   showing new ischemic changes (Type 1, Type 2, Type 3, or Type 4a MI)  - Development of pathological Q waves (Types 1 - 5 MI)  - Imaging evidence of new loss of viable myocardium or new regional wall   motion abnormality in a pattern consistent with an ischemic etiology (Types 1   - 5 MI)  Options provided:  -- MI type II due to drug overdose present as evidenced by, Please document   indicators of myocardial infarction. -- MI type II ruled out after study and demand ischemia due to drug overdose   confirmed  -- MI type II ruled out after study and non-ischemic myocardial injury due to   drug overdose confirmed  -- Other - I will add my own diagnosis  -- Disagree - Not applicable / Not valid  -- Disagree - Clinically unable to determine / Unknown  -- Refer to Clinical Documentation Reviewer    PROVIDER RESPONSE TEXT:    Type II MI was ruled out after study and non-ischemic myocardial injury due to   drug overdose confirmed. Query created by: Mindy Lerma on 9/27/2022 4:30 AM      Electronically signed by:   Samanta Valle DO 9/27/2022 7:27 AM

## 2022-10-12 PROBLEM — R77.8 ELEVATED TROPONIN: Status: RESOLVED | Noted: 2022-09-12 | Resolved: 2022-10-12
